# Patient Record
Sex: FEMALE | Race: WHITE | NOT HISPANIC OR LATINO | Employment: OTHER | URBAN - NONMETROPOLITAN AREA
[De-identification: names, ages, dates, MRNs, and addresses within clinical notes are randomized per-mention and may not be internally consistent; named-entity substitution may affect disease eponyms.]

---

## 2017-01-04 ENCOUNTER — HOSPITAL ENCOUNTER (OUTPATIENT)
Dept: MAMMOGRAPHY | Facility: HOSPITAL | Age: 62
End: 2017-01-04
Attending: FAMILY MEDICINE

## 2017-01-05 ENCOUNTER — HOSPITAL ENCOUNTER (OUTPATIENT)
Dept: MAMMOGRAPHY | Facility: HOSPITAL | Age: 62
Discharge: HOME | End: 2017-01-05
Attending: FAMILY MEDICINE

## 2018-06-10 ENCOUNTER — APPOINTMENT (OUTPATIENT)
Dept: CT IMAGING | Facility: HOSPITAL | Age: 63
End: 2018-06-10

## 2018-06-10 ENCOUNTER — APPOINTMENT (OUTPATIENT)
Dept: GENERAL RADIOLOGY | Facility: HOSPITAL | Age: 63
End: 2018-06-10

## 2018-06-10 ENCOUNTER — HOSPITAL ENCOUNTER (INPATIENT)
Facility: HOSPITAL | Age: 63
LOS: 1 days | Discharge: HOME | End: 2018-06-12
Attending: FAMILY MEDICINE | Admitting: FAMILY MEDICINE

## 2018-06-10 DIAGNOSIS — R16.2 HEPATOSPLENOMEGALY: ICD-10-CM

## 2018-06-10 DIAGNOSIS — N30.90 CYSTITIS: ICD-10-CM

## 2018-06-10 DIAGNOSIS — D61.818 PANCYTOPENIA (HCC): Primary | ICD-10-CM

## 2018-06-10 DIAGNOSIS — E86.0 DEHYDRATION: ICD-10-CM

## 2018-06-10 LAB
027 TOXIN: NORMAL
A-A DO2: 37.4 MMHG (ref 0–300)
ALBUMIN SERPL-MCNC: 4.6 G/DL (ref 3.4–4.8)
ALBUMIN/GLOB SERPL: 1.4 G/DL (ref 1.5–2.5)
ALP SERPL-CCNC: 80 U/L (ref 35–104)
ALT SERPL W P-5'-P-CCNC: 55 U/L (ref 10–36)
ANION GAP SERPL CALCULATED.3IONS-SCNC: 9.5 MMOL/L (ref 3.6–11.2)
APTT PPP: 28.3 SECONDS (ref 23.8–36.1)
ARTERIAL PATENCY WRIST A: ABNORMAL
AST SERPL-CCNC: 90 U/L (ref 10–30)
ATMOSPHERIC PRESS: 726 MMHG
BACTERIA UR QL AUTO: ABNORMAL /HPF
BASE EXCESS BLDA CALC-SCNC: -4.3 MMOL/L
BASOPHILS # BLD AUTO: 0.03 10*3/MM3 (ref 0–0.3)
BASOPHILS NFR BLD AUTO: 1.8 % (ref 0–2)
BDY SITE: ABNORMAL
BILIRUB SERPL-MCNC: 0.5 MG/DL (ref 0.2–1.8)
BILIRUB UR QL STRIP: NEGATIVE
BNP SERPL-MCNC: 28 PG/ML (ref 0–100)
BODY TEMPERATURE: 98.6 C
BUN BLD-MCNC: 25 MG/DL (ref 7–21)
BUN/CREAT SERPL: 21 (ref 7–25)
C DIFF TOX GENS STL QL NAA+PROBE: NEGATIVE
CALCIUM SPEC-SCNC: 8.9 MG/DL (ref 7.7–10)
CHLORIDE SERPL-SCNC: 96 MMOL/L (ref 99–112)
CLARITY UR: ABNORMAL
CO2 SERPL-SCNC: 21.5 MMOL/L (ref 24.3–31.9)
COHGB MFR BLD: 1.7 % (ref 0–5)
COLOR UR: YELLOW
CREAT BLD-MCNC: 1.19 MG/DL (ref 0.43–1.29)
CRP SERPL-MCNC: 9.19 MG/DL (ref 0–0.99)
D-LACTATE SERPL-SCNC: 1 MMOL/L (ref 0.5–2)
DEPRECATED RDW RBC AUTO: 46.8 FL (ref 37–54)
EOSINOPHIL # BLD AUTO: 0 10*3/MM3 (ref 0–0.7)
EOSINOPHIL NFR BLD AUTO: 0 % (ref 0–5)
ERYTHROCYTE [DISTWIDTH] IN BLOOD BY AUTOMATED COUNT: 16.5 % (ref 11.5–14.5)
GFR SERPL CREATININE-BSD FRML MDRD: 46 ML/MIN/1.73
GLOBULIN UR ELPH-MCNC: 3.2 GM/DL
GLUCOSE BLD-MCNC: 112 MG/DL (ref 70–110)
GLUCOSE BLDC GLUCOMTR-MCNC: 80 MG/DL (ref 70–130)
GLUCOSE UR STRIP-MCNC: ABNORMAL MG/DL
HCO3 BLDA-SCNC: 18.5 MMOL/L (ref 22–26)
HCT VFR BLD AUTO: 35 % (ref 37–47)
HCT VFR BLD CALC: 33 % (ref 37–47)
HGB BLD-MCNC: 11.1 G/DL (ref 12–16)
HGB BLDA-MCNC: 11.3 G/DL (ref 12–16)
HGB UR QL STRIP.AUTO: ABNORMAL
HOROWITZ INDEX BLD+IHG-RTO: 21 %
HYALINE CASTS UR QL AUTO: ABNORMAL /LPF
IMM GRANULOCYTES # BLD: 0 10*3/MM3 (ref 0–0.03)
IMM GRANULOCYTES NFR BLD: 0 % (ref 0–0.5)
INR PPP: 1.1 (ref 0.9–1.1)
KETONES UR QL STRIP: ABNORMAL
LACTOFERRIN STL QL LA: NEGATIVE
LEUKOCYTE ESTERASE UR QL STRIP.AUTO: ABNORMAL
LIPASE SERPL-CCNC: 49 U/L (ref 13–60)
LYMPHOCYTES # BLD AUTO: 0.26 10*3/MM3 (ref 1–3)
LYMPHOCYTES NFR BLD AUTO: 15.4 % (ref 21–51)
MCH RBC QN AUTO: 25.4 PG (ref 27–33)
MCHC RBC AUTO-ENTMCNC: 31.7 G/DL (ref 33–37)
MCV RBC AUTO: 80.1 FL (ref 80–94)
METHGB BLD QL: 0.2 % (ref 0–3)
MODALITY: ABNORMAL
MONOCYTES # BLD AUTO: 0.16 10*3/MM3 (ref 0.1–0.9)
MONOCYTES NFR BLD AUTO: 9.5 % (ref 0–10)
NEUTROPHILS # BLD AUTO: 1.24 10*3/MM3 (ref 1.4–6.5)
NEUTROPHILS NFR BLD AUTO: 73.3 % (ref 30–70)
NITRITE UR QL STRIP: NEGATIVE
OSMOLALITY SERPL CALC.SUM OF ELEC: 260.4 MOSM/KG (ref 273–305)
OXYHGB MFR BLDV: 92.4 % (ref 85–100)
PCO2 BLDA: 27.3 MM HG (ref 35–45)
PH BLDA: 7.45 PH UNITS (ref 7.35–7.45)
PH UR STRIP.AUTO: <=5 [PH] (ref 5–8)
PLATELET # BLD AUTO: 75 10*3/MM3 (ref 130–400)
PMV BLD AUTO: 10.4 FL (ref 6–10)
PO2 BLDA: 72.5 MM HG (ref 80–100)
POTASSIUM BLD-SCNC: 3.9 MMOL/L (ref 3.5–5.3)
PROT SERPL-MCNC: 7.8 G/DL (ref 6–8)
PROT UR QL STRIP: ABNORMAL
PROTHROMBIN TIME: 14.4 SECONDS (ref 11–15.4)
RBC # BLD AUTO: 4.37 10*6/MM3 (ref 4.2–5.4)
RBC # UR: ABNORMAL /HPF
REF LAB TEST METHOD: ABNORMAL
SAO2 % BLDCOA: 94.2 % (ref 90–100)
SODIUM BLD-SCNC: 127 MMOL/L (ref 135–153)
SP GR UR STRIP: 1.03 (ref 1–1.03)
SQUAMOUS #/AREA URNS HPF: ABNORMAL /HPF
TROPONIN I SERPL-MCNC: 0.02 NG/ML
UROBILINOGEN UR QL STRIP: ABNORMAL
WBC NRBC COR # BLD: 1.69 10*3/MM3 (ref 4.5–12.5)
WBC UR QL AUTO: ABNORMAL /HPF
YEAST URNS QL MICRO: ABNORMAL /HPF

## 2018-06-10 PROCEDURE — 74177 CT ABD & PELVIS W/CONTRAST: CPT

## 2018-06-10 PROCEDURE — 81001 URINALYSIS AUTO W/SCOPE: CPT | Performed by: FAMILY MEDICINE

## 2018-06-10 PROCEDURE — 80053 COMPREHEN METABOLIC PANEL: CPT | Performed by: FAMILY MEDICINE

## 2018-06-10 PROCEDURE — 25010000002 ONDANSETRON PER 1 MG: Performed by: FAMILY MEDICINE

## 2018-06-10 PROCEDURE — 86668 FRANCISELLA TULARENSIS: CPT | Performed by: FAMILY MEDICINE

## 2018-06-10 PROCEDURE — 87493 C DIFF AMPLIFIED PROBE: CPT | Performed by: FAMILY MEDICINE

## 2018-06-10 PROCEDURE — G0378 HOSPITAL OBSERVATION PER HR: HCPCS

## 2018-06-10 PROCEDURE — 86618 LYME DISEASE ANTIBODY: CPT | Performed by: FAMILY MEDICINE

## 2018-06-10 PROCEDURE — 84484 ASSAY OF TROPONIN QUANT: CPT | Performed by: FAMILY MEDICINE

## 2018-06-10 PROCEDURE — 83690 ASSAY OF LIPASE: CPT | Performed by: FAMILY MEDICINE

## 2018-06-10 PROCEDURE — 25010000002 IOPAMIDOL 61 % SOLUTION: Performed by: FAMILY MEDICINE

## 2018-06-10 PROCEDURE — 86140 C-REACTIVE PROTEIN: CPT | Performed by: FAMILY MEDICINE

## 2018-06-10 PROCEDURE — 85060 BLOOD SMEAR INTERPRETATION: CPT | Performed by: FAMILY MEDICINE

## 2018-06-10 PROCEDURE — 87045 FECES CULTURE AEROBIC BACT: CPT | Performed by: FAMILY MEDICINE

## 2018-06-10 PROCEDURE — 74177 CT ABD & PELVIS W/CONTRAST: CPT | Performed by: RADIOLOGY

## 2018-06-10 PROCEDURE — 99283 EMERGENCY DEPT VISIT LOW MDM: CPT

## 2018-06-10 PROCEDURE — 86666 EHRLICHIA ANTIBODY: CPT | Performed by: FAMILY MEDICINE

## 2018-06-10 PROCEDURE — 93005 ELECTROCARDIOGRAM TRACING: CPT | Performed by: FAMILY MEDICINE

## 2018-06-10 PROCEDURE — 87899 AGENT NOS ASSAY W/OPTIC: CPT | Performed by: FAMILY MEDICINE

## 2018-06-10 PROCEDURE — 82805 BLOOD GASES W/O2 SATURATION: CPT | Performed by: FAMILY MEDICINE

## 2018-06-10 PROCEDURE — 87040 BLOOD CULTURE FOR BACTERIA: CPT | Performed by: FAMILY MEDICINE

## 2018-06-10 PROCEDURE — 86757 RICKETTSIA ANTIBODY: CPT | Performed by: FAMILY MEDICINE

## 2018-06-10 PROCEDURE — 36415 COLL VENOUS BLD VENIPUNCTURE: CPT

## 2018-06-10 PROCEDURE — 93010 ELECTROCARDIOGRAM REPORT: CPT | Performed by: INTERNAL MEDICINE

## 2018-06-10 PROCEDURE — 82962 GLUCOSE BLOOD TEST: CPT

## 2018-06-10 PROCEDURE — 71045 X-RAY EXAM CHEST 1 VIEW: CPT | Performed by: RADIOLOGY

## 2018-06-10 PROCEDURE — 83605 ASSAY OF LACTIC ACID: CPT | Performed by: FAMILY MEDICINE

## 2018-06-10 PROCEDURE — 82375 ASSAY CARBOXYHB QUANT: CPT | Performed by: FAMILY MEDICINE

## 2018-06-10 PROCEDURE — 25010000002 ENOXAPARIN PER 10 MG: Performed by: FAMILY MEDICINE

## 2018-06-10 PROCEDURE — 36600 WITHDRAWAL OF ARTERIAL BLOOD: CPT | Performed by: FAMILY MEDICINE

## 2018-06-10 PROCEDURE — 86753 PROTOZOA ANTIBODY NOS: CPT | Performed by: FAMILY MEDICINE

## 2018-06-10 PROCEDURE — 99284 EMERGENCY DEPT VISIT MOD MDM: CPT

## 2018-06-10 PROCEDURE — 85610 PROTHROMBIN TIME: CPT | Performed by: FAMILY MEDICINE

## 2018-06-10 PROCEDURE — 83050 HGB METHEMOGLOBIN QUAN: CPT | Performed by: FAMILY MEDICINE

## 2018-06-10 PROCEDURE — 87046 STOOL CULTR AEROBIC BACT EA: CPT | Performed by: FAMILY MEDICINE

## 2018-06-10 PROCEDURE — 85025 COMPLETE CBC W/AUTO DIFF WBC: CPT | Performed by: FAMILY MEDICINE

## 2018-06-10 PROCEDURE — 94799 UNLISTED PULMONARY SVC/PX: CPT

## 2018-06-10 PROCEDURE — 83880 ASSAY OF NATRIURETIC PEPTIDE: CPT | Performed by: FAMILY MEDICINE

## 2018-06-10 PROCEDURE — 85730 THROMBOPLASTIN TIME PARTIAL: CPT | Performed by: FAMILY MEDICINE

## 2018-06-10 PROCEDURE — 71045 X-RAY EXAM CHEST 1 VIEW: CPT

## 2018-06-10 PROCEDURE — 83631 LACTOFERRIN FECAL (QUANT): CPT | Performed by: FAMILY MEDICINE

## 2018-06-10 PROCEDURE — 25010000002 CEFTRIAXONE: Performed by: FAMILY MEDICINE

## 2018-06-10 RX ORDER — ROSUVASTATIN CALCIUM 20 MG/1
20 TABLET, COATED ORAL NIGHTLY
COMMUNITY

## 2018-06-10 RX ORDER — DEXTROSE MONOHYDRATE 25 G/50ML
25 INJECTION, SOLUTION INTRAVENOUS
Status: DISCONTINUED | OUTPATIENT
Start: 2018-06-10 | End: 2018-06-12 | Stop reason: HOSPADM

## 2018-06-10 RX ORDER — GABAPENTIN 400 MG/1
800 CAPSULE ORAL EVERY 8 HOURS SCHEDULED
Status: DISCONTINUED | OUTPATIENT
Start: 2018-06-10 | End: 2018-06-12 | Stop reason: HOSPADM

## 2018-06-10 RX ORDER — SODIUM CHLORIDE 9 MG/ML
125 INJECTION, SOLUTION INTRAVENOUS CONTINUOUS
Status: DISCONTINUED | OUTPATIENT
Start: 2018-06-10 | End: 2018-06-10

## 2018-06-10 RX ORDER — ACETAMINOPHEN 325 MG/1
650 TABLET ORAL EVERY 6 HOURS PRN
Status: DISCONTINUED | OUTPATIENT
Start: 2018-06-10 | End: 2018-06-12 | Stop reason: HOSPADM

## 2018-06-10 RX ORDER — NICOTINE POLACRILEX 4 MG
15 LOZENGE BUCCAL
Status: DISCONTINUED | OUTPATIENT
Start: 2018-06-10 | End: 2018-06-12 | Stop reason: HOSPADM

## 2018-06-10 RX ORDER — ONDANSETRON 2 MG/ML
4 INJECTION INTRAMUSCULAR; INTRAVENOUS EVERY 6 HOURS PRN
Status: DISCONTINUED | OUTPATIENT
Start: 2018-06-10 | End: 2018-06-12 | Stop reason: HOSPADM

## 2018-06-10 RX ORDER — ROSUVASTATIN CALCIUM 20 MG/1
20 TABLET, COATED ORAL NIGHTLY
Status: DISCONTINUED | OUTPATIENT
Start: 2018-06-10 | End: 2018-06-12 | Stop reason: HOSPADM

## 2018-06-10 RX ORDER — PANTOPRAZOLE SODIUM 40 MG/1
40 TABLET, DELAYED RELEASE ORAL DAILY
Status: DISCONTINUED | OUTPATIENT
Start: 2018-06-11 | End: 2018-06-12 | Stop reason: HOSPADM

## 2018-06-10 RX ORDER — DEXTROSE AND SODIUM CHLORIDE 5; .45 G/100ML; G/100ML
100 INJECTION, SOLUTION INTRAVENOUS CONTINUOUS
Status: DISCONTINUED | OUTPATIENT
Start: 2018-06-10 | End: 2018-06-12 | Stop reason: HOSPADM

## 2018-06-10 RX ORDER — SODIUM CHLORIDE 0.9 % (FLUSH) 0.9 %
10 SYRINGE (ML) INJECTION AS NEEDED
Status: DISCONTINUED | OUTPATIENT
Start: 2018-06-10 | End: 2018-06-12 | Stop reason: HOSPADM

## 2018-06-10 RX ORDER — NITROFURANTOIN MACROCRYSTALS 100 MG/1
100 CAPSULE ORAL 4 TIMES DAILY
Status: ON HOLD | COMMUNITY
End: 2018-06-10

## 2018-06-10 RX ORDER — NITROGLYCERIN 0.4 MG/1
0.4 TABLET SUBLINGUAL
Status: DISCONTINUED | OUTPATIENT
Start: 2018-06-10 | End: 2018-06-12 | Stop reason: HOSPADM

## 2018-06-10 RX ORDER — ONDANSETRON 4 MG/1
4 TABLET, FILM COATED ORAL EVERY 8 HOURS PRN
Status: CANCELLED | OUTPATIENT
Start: 2018-06-10

## 2018-06-10 RX ORDER — BACLOFEN 10 MG/1
10 TABLET ORAL 3 TIMES DAILY
Status: DISCONTINUED | OUTPATIENT
Start: 2018-06-10 | End: 2018-06-12 | Stop reason: HOSPADM

## 2018-06-10 RX ORDER — METOPROLOL SUCCINATE 25 MG/1
25 TABLET, EXTENDED RELEASE ORAL DAILY
Status: DISCONTINUED | OUTPATIENT
Start: 2018-06-11 | End: 2018-06-12 | Stop reason: HOSPADM

## 2018-06-10 RX ORDER — ONDANSETRON 4 MG/1
4 TABLET, FILM COATED ORAL EVERY 8 HOURS PRN
COMMUNITY

## 2018-06-10 RX ORDER — OXYCODONE AND ACETAMINOPHEN 7.5; 325 MG/1; MG/1
1 TABLET ORAL EVERY 6 HOURS PRN
Status: CANCELLED | OUTPATIENT
Start: 2018-06-10

## 2018-06-10 RX ORDER — GLIPIZIDE 5 MG/1
10 TABLET ORAL
Status: DISCONTINUED | OUTPATIENT
Start: 2018-06-11 | End: 2018-06-12 | Stop reason: HOSPADM

## 2018-06-10 RX ORDER — AMITRIPTYLINE HYDROCHLORIDE 25 MG/1
25 TABLET, FILM COATED ORAL NIGHTLY
Status: DISCONTINUED | OUTPATIENT
Start: 2018-06-10 | End: 2018-06-12 | Stop reason: HOSPADM

## 2018-06-10 RX ORDER — VENLAFAXINE 37.5 MG/1
75 TABLET ORAL 2 TIMES DAILY
Status: DISCONTINUED | OUTPATIENT
Start: 2018-06-10 | End: 2018-06-12 | Stop reason: HOSPADM

## 2018-06-10 RX ORDER — OXYCODONE AND ACETAMINOPHEN 7.5; 325 MG/1; MG/1
1 TABLET ORAL EVERY 6 HOURS PRN
Status: DISCONTINUED | OUTPATIENT
Start: 2018-06-10 | End: 2018-06-12 | Stop reason: HOSPADM

## 2018-06-10 RX ADMIN — AMITRIPTYLINE HYDROCHLORIDE 25 MG: 25 TABLET, FILM COATED ORAL at 22:17

## 2018-06-10 RX ADMIN — GABAPENTIN 800 MG: 400 CAPSULE ORAL at 22:16

## 2018-06-10 RX ADMIN — ENOXAPARIN SODIUM 40 MG: 100 INJECTION SUBCUTANEOUS at 22:21

## 2018-06-10 RX ADMIN — SODIUM CHLORIDE 125 ML/HR: 9 INJECTION, SOLUTION INTRAVENOUS at 14:24

## 2018-06-10 RX ADMIN — ROSUVASTATIN CALCIUM 20 MG: 20 TABLET, FILM COATED ORAL at 22:17

## 2018-06-10 RX ADMIN — METFORMIN HYDROCHLORIDE 1000 MG: 500 TABLET ORAL at 22:19

## 2018-06-10 RX ADMIN — VENLAFAXINE HYDROCHLORIDE 75 MG: 37.5 TABLET ORAL at 22:16

## 2018-06-10 RX ADMIN — ONDANSETRON 4 MG: 2 INJECTION INTRAMUSCULAR; INTRAVENOUS at 20:33

## 2018-06-10 RX ADMIN — OXYCODONE HYDROCHLORIDE AND ACETAMINOPHEN 1 TABLET: 7.5; 325 TABLET ORAL at 20:33

## 2018-06-10 RX ADMIN — CEFTRIAXONE 1 G: 1 INJECTION, POWDER, FOR SOLUTION INTRAMUSCULAR; INTRAVENOUS at 18:23

## 2018-06-10 RX ADMIN — BACLOFEN 10 MG: 10 TABLET ORAL at 22:17

## 2018-06-10 RX ADMIN — DEXTROSE AND SODIUM CHLORIDE 100 ML/HR: 5; 450 INJECTION, SOLUTION INTRAVENOUS at 20:33

## 2018-06-10 RX ADMIN — DOXYCYCLINE 100 MG: 100 INJECTION, POWDER, LYOPHILIZED, FOR SOLUTION INTRAVENOUS at 19:20

## 2018-06-10 RX ADMIN — IOPAMIDOL 100 ML: 612 INJECTION, SOLUTION INTRAVENOUS at 16:35

## 2018-06-10 NOTE — ED NOTES
Pt being transported to room 316 at this via wheelchair per ED tech. Pt has patent IV to left AC with IV fluids and abx infusing as ordered. NADN. Report called to Jeny Alcantara RN     Marianna Ivy RN  06/10/18 1925

## 2018-06-10 NOTE — ED PROVIDER NOTES
Subjective   62-year-old white female with past history of hypertension hyperlipidemia diabetes presents emergency department complaining of weakness diarrhea and not feeling well patient says she went to the emergency department at [place] on 6/8/18 I was diagnosed with a urinary tract infection and was put on Macrobid home said developed diarrhea and just is not feeling any better or said she went home, and the reason they did originally went to the emergency department because she's been having muscle cramps and just wasn't feeling well and she still not feeling any better and came in to be evaluated        History provided by:  Patient  Illness   Location:  Diarrhea, fatigue, recent diagnosis of UTI  Severity:  Moderate  Onset quality:  Gradual  Timing:  Constant  Progression:  Worsening  Chronicity:  New  Context:  Diagnoses UTI on 68 and is now developed fatigue diarrhea and just not feeling any better  Associated symptoms: fatigue and myalgias        Review of Systems   Constitutional: Positive for fatigue.   Musculoskeletal: Positive for myalgias.       Past Medical History:   Diagnosis Date   • Arthritis    • Depression    • Diabetes mellitus    • Hyperlipidemia    • Hypertension    • Incontinence     B&B   • Infectious viral hepatitis    • Kidney stone        No Known Allergies    Past Surgical History:   Procedure Laterality Date   • APPENDECTOMY     • CHOLECYSTECTOMY     • EYE SURGERY     • HEMORRHOIDECTOMY     • SPINE SURGERY      lumbar   • TEMPOROMANDIBULAR JOINT SURGERY     • TUBAL ABDOMINAL LIGATION         Family History   Problem Relation Age of Onset   • Diabetes Mother    • Heart disease Father        Social History     Social History   • Marital status:      Social History Main Topics   • Smoking status: Former Smoker   • Alcohol use No   • Drug use: No     Other Topics Concern   • Not on file           Objective   Physical Exam   Constitutional: She is oriented to person, place, and  time. She appears well-developed and well-nourished.   HENT:   Head: Normocephalic and atraumatic.   Right Ear: External ear normal.   Left Ear: External ear normal.   Nose: Nose normal.   Mouth/Throat: Oropharynx is clear and moist.   Eyes: EOM are normal. Pupils are equal, round, and reactive to light.   Neck: Neck supple.   Cardiovascular: Normal rate and regular rhythm.    Pulmonary/Chest: Effort normal and breath sounds normal.   Abdominal: Soft. Bowel sounds are normal.   Musculoskeletal: Normal range of motion.   Neurological: She is alert and oriented to person, place, and time.   Skin: Skin is warm. Capillary refill takes less than 2 seconds.   Psychiatric: She has a normal mood and affect. Her behavior is normal. Judgment and thought content normal.   Nursing note and vitals reviewed.      Procedures           ED Course  ED Course as of Kush 10 1747   Sun Kush 10, 2018   1728 Patient seen at [facility] on 6/8/18 and diagnosed with a urinary tract infection  [MH]      ED Course User Index  [MH] Debbie Gilliam, DO                  MDM  Number of Diagnoses or Management Options  Cystitis: new and requires workup  Dehydration: new and requires workup  Hepatosplenomegaly: new and requires workup  Pancytopenia: new and requires workup     Amount and/or Complexity of Data Reviewed  Clinical lab tests: ordered and reviewed  Tests in the radiology section of CPT®: reviewed and ordered  Tests in the medicine section of CPT®: reviewed and ordered  Discuss the patient with other providers: yes  Independent visualization of images, tracings, or specimens: yes          Final diagnoses:   Pancytopenia   Cystitis   Dehydration            Debbie Gilliam DO  06/11/18 0213

## 2018-06-10 NOTE — ED NOTES
Family and pt reports pt has been having some spasms that started on Monday, seen at [facility] on Friday, x3 days with vomiting, diarrhea, not eating     Luanne Kirkland RN  06/10/18 0270

## 2018-06-10 NOTE — ED NOTES
Pt assisted by er tech to bathroom and back to room, tolerated well     Luanne Kirkland RN  06/10/18 [id number]

## 2018-06-10 NOTE — ED NOTES
Assisted pt restroom via wheelchair to obtain urine and stool samples. Urine specimen obtained. Pt was unable to provide stool sample at this time. Nurse aware.     Katerina Robledo  06/10/18 3332

## 2018-06-11 LAB
ALBUMIN SERPL-MCNC: 3.9 G/DL (ref 3.4–4.8)
ALBUMIN/GLOB SERPL: 1.5 G/DL (ref 1.5–2.5)
ALP SERPL-CCNC: 70 U/L (ref 35–104)
ALT SERPL W P-5'-P-CCNC: 47 U/L (ref 10–36)
ANION GAP SERPL CALCULATED.3IONS-SCNC: 8.2 MMOL/L (ref 3.6–11.2)
ANISOCYTOSIS BLD QL: ABNORMAL
AST SERPL-CCNC: 83 U/L (ref 10–30)
BASOPHILS # BLD MANUAL: 0.03 10*3/MM3 (ref 0–0.3)
BASOPHILS NFR BLD AUTO: 2 % (ref 0–2)
BILIRUB SERPL-MCNC: 0.3 MG/DL (ref 0.2–1.8)
BUN BLD-MCNC: 23 MG/DL (ref 7–21)
BUN/CREAT SERPL: 21.5 (ref 7–25)
CALCIUM SPEC-SCNC: 8 MG/DL (ref 7.7–10)
CHLORIDE SERPL-SCNC: 102 MMOL/L (ref 99–112)
CO2 SERPL-SCNC: 19.8 MMOL/L (ref 24.3–31.9)
CREAT BLD-MCNC: 1.07 MG/DL (ref 0.43–1.29)
DEPRECATED RDW RBC AUTO: 46.6 FL (ref 37–54)
ERYTHROCYTE [DISTWIDTH] IN BLOOD BY AUTOMATED COUNT: 16.4 % (ref 11.5–14.5)
GFR SERPL CREATININE-BSD FRML MDRD: 52 ML/MIN/1.73
GLOBULIN UR ELPH-MCNC: 2.6 GM/DL
GLUCOSE BLD-MCNC: 98 MG/DL (ref 70–110)
GLUCOSE BLDC GLUCOMTR-MCNC: 123 MG/DL (ref 70–130)
GLUCOSE BLDC GLUCOMTR-MCNC: 169 MG/DL (ref 70–130)
GLUCOSE BLDC GLUCOMTR-MCNC: 48 MG/DL (ref 70–130)
GLUCOSE BLDC GLUCOMTR-MCNC: 55 MG/DL (ref 70–130)
GLUCOSE BLDC GLUCOMTR-MCNC: 60 MG/DL (ref 70–130)
GLUCOSE BLDC GLUCOMTR-MCNC: 86 MG/DL (ref 70–130)
GLUCOSE BLDC GLUCOMTR-MCNC: 89 MG/DL (ref 70–130)
HCT VFR BLD AUTO: 32.8 % (ref 37–47)
HGB BLD-MCNC: 10.5 G/DL (ref 12–16)
HYPOCHROMIA BLD QL: ABNORMAL
LYMPHOCYTES # BLD MANUAL: 0.48 10*3/MM3 (ref 1–3)
LYMPHOCYTES NFR BLD MANUAL: 24 % (ref 0–10)
LYMPHOCYTES NFR BLD MANUAL: 28 % (ref 21–51)
MCH RBC QN AUTO: 25.7 PG (ref 27–33)
MCHC RBC AUTO-ENTMCNC: 32 G/DL (ref 33–37)
MCV RBC AUTO: 80.2 FL (ref 80–94)
MONOCYTES # BLD AUTO: 0.41 10*3/MM3 (ref 0.1–0.9)
NEUTROPHILS # BLD AUTO: 0.79 10*3/MM3 (ref 1.4–6.5)
NEUTROPHILS NFR BLD MANUAL: 42 % (ref 30–70)
NEUTS BAND NFR BLD MANUAL: 4 % (ref 4–12)
NRBC SPEC MANUAL: 0 /100 WBC (ref 0–0)
OSMOLALITY SERPL CALC.SUM OF ELEC: 264.5 MOSM/KG (ref 273–305)
PLATELET # BLD AUTO: 55 10*3/MM3 (ref 130–400)
PMV BLD AUTO: 11 FL (ref 6–10)
POTASSIUM BLD-SCNC: 3.5 MMOL/L (ref 3.5–5.3)
PROT SERPL-MCNC: 6.5 G/DL (ref 6–8)
RBC # BLD AUTO: 4.09 10*6/MM3 (ref 4.2–5.4)
SMALL PLATELETS BLD QL SMEAR: ABNORMAL
SODIUM BLD-SCNC: 130 MMOL/L (ref 135–153)
WBC NRBC COR # BLD: 1.72 10*3/MM3 (ref 4.5–12.5)

## 2018-06-11 PROCEDURE — 80053 COMPREHEN METABOLIC PANEL: CPT | Performed by: FAMILY MEDICINE

## 2018-06-11 PROCEDURE — 63710000001 INSULIN DETEMIR PER 5 UNITS: Performed by: FAMILY MEDICINE

## 2018-06-11 PROCEDURE — 94799 UNLISTED PULMONARY SVC/PX: CPT

## 2018-06-11 PROCEDURE — 85025 COMPLETE CBC W/AUTO DIFF WBC: CPT | Performed by: FAMILY MEDICINE

## 2018-06-11 PROCEDURE — 82962 GLUCOSE BLOOD TEST: CPT

## 2018-06-11 PROCEDURE — 85007 BL SMEAR W/DIFF WBC COUNT: CPT | Performed by: FAMILY MEDICINE

## 2018-06-11 PROCEDURE — 25010000002 CEFTRIAXONE: Performed by: FAMILY MEDICINE

## 2018-06-11 RX ADMIN — VENLAFAXINE HYDROCHLORIDE 75 MG: 37.5 TABLET ORAL at 09:27

## 2018-06-11 RX ADMIN — BACLOFEN 10 MG: 10 TABLET ORAL at 20:17

## 2018-06-11 RX ADMIN — GABAPENTIN 800 MG: 400 CAPSULE ORAL at 05:12

## 2018-06-11 RX ADMIN — INSULIN DETEMIR 58 UNITS: 100 INJECTION, SOLUTION SUBCUTANEOUS at 09:00

## 2018-06-11 RX ADMIN — PANTOPRAZOLE SODIUM 40 MG: 40 TABLET, DELAYED RELEASE ORAL at 09:27

## 2018-06-11 RX ADMIN — METFORMIN HYDROCHLORIDE 1000 MG: 500 TABLET ORAL at 17:25

## 2018-06-11 RX ADMIN — GABAPENTIN 800 MG: 400 CAPSULE ORAL at 20:17

## 2018-06-11 RX ADMIN — CEFTRIAXONE 1 G: 1 INJECTION, POWDER, FOR SOLUTION INTRAMUSCULAR; INTRAVENOUS at 12:31

## 2018-06-11 RX ADMIN — METOPROLOL SUCCINATE 25 MG: 25 TABLET, FILM COATED, EXTENDED RELEASE ORAL at 09:27

## 2018-06-11 RX ADMIN — GABAPENTIN 800 MG: 400 CAPSULE ORAL at 13:35

## 2018-06-11 RX ADMIN — ACETAMINOPHEN 650 MG: 325 TABLET, FILM COATED ORAL at 03:37

## 2018-06-11 RX ADMIN — GLIPIZIDE 10 MG: 5 TABLET ORAL at 09:27

## 2018-06-11 RX ADMIN — METFORMIN HYDROCHLORIDE 1000 MG: 500 TABLET ORAL at 09:27

## 2018-06-11 RX ADMIN — DOXYCYCLINE 100 MG: 100 INJECTION, POWDER, LYOPHILIZED, FOR SOLUTION INTRAVENOUS at 05:13

## 2018-06-11 RX ADMIN — OXYCODONE HYDROCHLORIDE AND ACETAMINOPHEN 1 TABLET: 7.5; 325 TABLET ORAL at 20:30

## 2018-06-11 RX ADMIN — VENLAFAXINE HYDROCHLORIDE 75 MG: 37.5 TABLET ORAL at 20:16

## 2018-06-11 RX ADMIN — DEXTROSE AND SODIUM CHLORIDE 100 ML/HR: 5; 450 INJECTION, SOLUTION INTRAVENOUS at 05:16

## 2018-06-11 RX ADMIN — ROSUVASTATIN CALCIUM 20 MG: 20 TABLET, FILM COATED ORAL at 20:16

## 2018-06-11 RX ADMIN — DOXYCYCLINE 100 MG: 100 INJECTION, POWDER, LYOPHILIZED, FOR SOLUTION INTRAVENOUS at 17:26

## 2018-06-11 RX ADMIN — GLIPIZIDE 10 MG: 5 TABLET ORAL at 17:25

## 2018-06-11 RX ADMIN — AMITRIPTYLINE HYDROCHLORIDE 25 MG: 25 TABLET, FILM COATED ORAL at 20:17

## 2018-06-11 RX ADMIN — OXYCODONE HYDROCHLORIDE AND ACETAMINOPHEN 1 TABLET: 7.5; 325 TABLET ORAL at 05:12

## 2018-06-11 RX ADMIN — BACLOFEN 10 MG: 10 TABLET ORAL at 09:27

## 2018-06-11 RX ADMIN — DEXTROSE MONOHYDRATE 25 G: 25 INJECTION, SOLUTION INTRAVENOUS at 22:27

## 2018-06-11 RX ADMIN — BACLOFEN 10 MG: 10 TABLET ORAL at 17:25

## 2018-06-11 NOTE — PLAN OF CARE
Problem: Fall Risk (Adult)  Goal: Identify Related Risk Factors and Signs and Symptoms  Outcome: Ongoing (interventions implemented as appropriate)    Goal: Absence of Fall  Outcome: Ongoing (interventions implemented as appropriate)      Problem: Pain, Acute (Adult)  Goal: Identify Related Risk Factors and Signs and Symptoms  Outcome: Ongoing (interventions implemented as appropriate)    Goal: Acceptable Pain Control/Comfort Level  Outcome: Ongoing (interventions implemented as appropriate)      Problem: Fluid Volume Deficit (Adult)  Goal: Identify Related Risk Factors and Signs and Symptoms  Outcome: Ongoing (interventions implemented as appropriate)    Goal: Optimal Fluid Balance  Outcome: Ongoing (interventions implemented as appropriate)

## 2018-06-11 NOTE — PROGRESS NOTES
"     LOS: 0 days     Chief Complaint:  Pancytopenia/UTI/Possible tick borne illness    Subjective     Interval History:   She reports that she is feeling some better this morning after having splet well overnight. She was noted febrile overnight with temp of 101.1. She remains on Rocephin + doxycycline. No further diarrhea or muscle spasms. WBC's 1.72, Hgb 10.5, platelets have dropped to 55,000. No further sweats or chills. She denies any acute complaints this AM. She is a poor historian and somewhat somnolent this AM.       Objective     Vital Signs  /64 (BP Location: Right arm, Patient Position: Lying)   Pulse 77   Temp 97.7 °F (36.5 °C) (Oral)   Resp 18   Ht 162.6 cm (64\")   Wt 81.6 kg (180 lb)   SpO2 95%   BMI 30.90 kg/m²   Intake & Output (last day)       06/10 0701 - 06/11 0700 06/11 0701 - 06/12 0700    I.V. (mL/kg) 1000 (12.3)     IV Piggyback 300     Total Intake(mL/kg) 1300 (15.9)     Urine (mL/kg/hr) 600     Stool 0     Total Output 600      Net +700            Unmeasured Stool Occurrence 2 x             Physical Exam:     General Appearance:    Solmnolent yet cooperative, in no acute distress   Head:    Normocephalic, without obvious abnormality, atraumatic   Eyes:            Lids and lashes normal, conjunctivae and sclerae normal, no   icterus, no pallor, corneas clear, PERRLA   Ears:    Ears appear intact with no abnormalities noted   Throat:   No oral lesions, no thrush, oral mucosa moist   Neck:   No adenopathy, supple, trachea midline, no thyromegaly, no   carotid bruit, no JVD   Lungs:     Clear to auscultation,respirations regular, even and                  unlabored    Heart:    Regular rhythm and normal rate, normal S1 and S2, no            murmur, no gallop, no rub, no click   Chest Wall:    No abnormalities observed   Abdomen:     Normal bowel sounds, no masses, no organomegaly, soft        non-tender, non-distended, no guarding, no rebound                tenderness   Extremities: "   Moves all extremities well, no edema, no cyanosis, no             redness   Pulses:   Pulses palpable and equal bilaterally   Skin:   No bleeding, bruising or rash   Lymph nodes:   No palpable adenopathy   Neurologic:   Cranial nerves 2 - 12 grossly intact, sensation intact, DTR       present and equal bilaterally        Results Review:    Lab Results   Component Value Date    WBC 1.72 (C) 06/11/2018    HGB 10.5 (L) 06/11/2018    HCT 32.8 (L) 06/11/2018    MCV 80.2 06/11/2018    PLT 55 (L) 06/11/2018       Lab Results   Component Value Date    GLUCOSE 98 06/11/2018    BUN 23 (H) 06/11/2018    CREATININE 1.07 06/11/2018    EGFRIFNONA 52 (L) 06/11/2018    BCR 21.5 06/11/2018     (L) 06/11/2018    K 3.5 06/11/2018     06/11/2018    CO2 19.8 (L) 06/11/2018    CALCIUM 8.0 06/11/2018    ALBUMIN 3.90 06/11/2018    LABIL2 1.5 06/11/2018    AST 83 (H) 06/11/2018    ALT 47 (H) 06/11/2018     Lab Results   Component Value Date    INR 1.10 06/10/2018       Glucose   Date Value Ref Range Status   06/11/2018 86 70 - 130 mg/dL Final   06/10/2018 80 70 - 130 mg/dL Final          Medication Review:     Current Facility-Administered Medications:   •  acetaminophen (TYLENOL) tablet 650 mg, 650 mg, Oral, Q6H PRN, Samuel Duane Kreis, MD, 650 mg at 06/11/18 0337  •  amitriptyline (ELAVIL) tablet 25 mg, 25 mg, Oral, Nightly, Samuel Duane Kreis, MD, 25 mg at 06/10/18 2217  •  baclofen (LIORESAL) tablet 10 mg, 10 mg, Oral, TID, Samuel Duane Kreis, MD, 10 mg at 06/10/18 2217  •  cefTRIAXone (ROCEPHIN) 1 g/100 mL 0.9% NS (MBP), 1 g, Intravenous, Q24H, Samuel Duane Kreis, MD  •  dextrose (D50W) solution 25 g, 25 g, Intravenous, Q15 Min PRN, Samuel Duane Kreis, MD  •  dextrose (GLUTOSE) oral gel 15 g, 15 g, Oral, Q15 Min PRN, Samuel Duane Kreis, MD  •  dextrose 5 % and sodium chloride 0.45 % infusion, 100 mL/hr, Intravenous, Continuous, Samuel Duane Kreis, MD, Last Rate: 100 mL/hr at 06/11/18 0516, 100 mL/hr at 06/11/18 0516  •   doxycycline (VIBRAMYCIN) 100 mg/100 mL 0.9% NS MBP, 100 mg, Intravenous, Q12H, Samuel Duane Kreis, MD, 100 mg at 06/11/18 0513  •  enoxaparin (LOVENOX) syringe 40 mg, 40 mg, Subcutaneous, Q24H, Samuel Duane Kreis, MD, 40 mg at 06/10/18 2221  •  gabapentin (NEURONTIN) capsule 800 mg, 800 mg, Oral, Q8H, Samuel Duane Kreis, MD, 800 mg at 06/11/18 0512  •  glipiZIDE (GLUCOTROL) tablet 10 mg, 10 mg, Oral, BID AC, Samuel Duane Kreis, MD  •  glucagon (human recombinant) (GLUCAGEN DIAGNOSTIC) injection 1 mg, 1 mg, Subcutaneous, PRN, Samuel Duane Kreis, MD  •  insulin aspart (novoLOG) injection 0-9 Units, 0-9 Units, Subcutaneous, 4x Daily AC & at Bedtime, Samuel Duane Kreis, MD  •  insulin detemir (LEVEMIR) injection 58 Units, 58 Units, Subcutaneous, Daily, Samuel Duane Kreis, MD  •  metFORMIN (GLUCOPHAGE) tablet 1,000 mg, 1,000 mg, Oral, BID With Meals, Samuel Duane Kreis, MD, 1,000 mg at 06/10/18 2219  •  metoprolol succinate XL (TOPROL-XL) 24 hr tablet 25 mg, 25 mg, Oral, Daily, Samuel Duane Kreis, MD  •  nitroglycerin (NITROSTAT) SL tablet 0.4 mg, 0.4 mg, Sublingual, Q5 Min PRN, Samuel Duane Kreis, MD  •  ondansetron (ZOFRAN) injection 4 mg, 4 mg, Intravenous, Q6H PRN, Samuel Duane Kreis, MD, 4 mg at 06/10/18 2033  •  oxyCODONE-acetaminophen (PERCOCET) 7.5-325 MG per tablet 1 tablet, 1 tablet, Oral, Q6H PRN, Samuel Duane Kreis, MD, 1 tablet at 06/11/18 0512  •  pantoprazole (PROTONIX) EC tablet 40 mg, 40 mg, Oral, Daily, Samuel Duane Kreis, MD  •  rosuvastatin (CRESTOR) tablet 20 mg, 20 mg, Oral, Nightly, Samuel Duane Kreis, MD, 20 mg at 06/10/18 2216  •  Insert peripheral IV, , , Once **AND** sodium chloride 0.9 % flush 10 mL, 10 mL, Intravenous, PRN, Debbie Gilliam, DO  •  venlafaxine (EFFEXOR) tablet 75 mg, 75 mg, Oral, BID, Samuel Duane Kreis, MD, 75 mg at 06/10/18 221      Assessment/Plan   Pancytopenia  Possible tick borne illness  UTI  DMII  HTN  Chronic low back pain  Peripheral  neuropathy  Fever    Continue medical monitoring    Awaiting tick borne illness panel    Continue with rocephin + Doxycycline    Continue with metformin + glipizide + levemir + S/S insulin for blood sugar regulation    Percocet 7.5 mg q 6 hours prn pain    Monitor menatal status and adjust medications accordingly    Consider ID and/or hem/onc consultation if not improvng    D/C lovenox secondary to drop in PLTs overnight, start Teds and SCDs    CBC, BMP daily    GARIMA Conteh  06/11/18  7:40 AM     She is seen this morning by me and I agree with the above note.  She continues to have a poor appetite and is hurting severely in her legs.  She continues to have some memory related issues.  She had a fever last night.  She denies any abdominal discomfort.  She is having some diarrhea.  Platelet count has dropped a little further to 55,000.  I continue to feel that this is likely a tick borne illness.  Peripheral smear is pending.  Continue Rocephin for UTI and doxycycline for probable tick borne illness    Samuel Duane Kreis, MD  06/11/18  4:10 PM

## 2018-06-11 NOTE — H&P
"      Chief complaint   Chief Complaint   Patient presents with   • Diarrhea       Subjective     Patient is a 62 y.o. female presents To the emergency department with multiple complaints.  She is not a very good historian.  It seems that about a week ago she started feeling bad with having \"spasms\" in her legs, right greater than left.  It felt like her right ankle was having spasm involved with her vagina.  She states that he got progressively worse than she started having body aches and just feeling bad.  She denies any rash.  A couple of nights ago she started having profound night sweats, drenching the left now started happening during the daytime as well.  Over the last few days she has developed profound nausea with vomiting and diarrhea.  She went to the emergency room and [place] on Friday.  She was diagnosed with urinary tract infection.  Apparently urine there showed too numerous to count white blood cells and 4+ bacteria.  She states that she does not have any bladder sensation nor does she have any consistent control of her bowel movements since lumbar spine surgery multiple years ago caused her to be a partial paraplegic.  However, she is able to ambulate with no assistive device after extensive rehabilitation.  She does take chronic pain medication.  She does admit to having dogs and has tick bites quite often, most recently one was pulled off of her back within the past 2 weeks.  She does not know if it was engorged because someone else pulled off with tweezers.  She has not had any rash or irritation around the tick bite site.  She denies any shortness of breath, headache, rash, swelling of any joints.  She was told the other night in the emergency department that her blood counts were a little bit low    Review of Systems   On review of systems the patient denies the following unless noted above:     Constitutional:  Fevers, chills, weight change, fatigue     Eyes: Vision changes, headache, double " vision, loss of vision     ENT: Runny nose, nose bleeds, ringing in ears, pain with swallowing, sore throat     Cardiovascular: Chest pains, palpitations, PND, orthopnea     Respiratory: Cough, wheezing, SOA, hemoptysis     GI:  Abdominal pain, diarrhea, constipation, change in stool caliber,    Rectal bleeding, vomiting or nausea     : Difficulty voiding, dysuria, hematuria     Musculoskeletal: Changes of any chronic joint pain, swelling     Skin: Rash, itching, easy bruisability     Neurological: Unilateral weakness, new onset numbness, speech difficulties     Psychiatric: Sadness, tearfulness, feelings of hopelessness, racing thoughts     Endocrine:  Heat or cold intolerance, mood swings, polydipsia, polyphagia,    recent hypoglycemia      History  Past Medical History:   Diagnosis Date   • Arthritis    • Depression    • Diabetes mellitus    • Hyperlipidemia    • Hypertension    • Incontinence     B&B   • Infectious viral hepatitis    • Kidney stone      Past Surgical History:   Procedure Laterality Date   • APPENDECTOMY     • CHOLECYSTECTOMY     • EYE SURGERY     • HEMORRHOIDECTOMY     • SPINE SURGERY      lumbar   • TEMPOROMANDIBULAR JOINT SURGERY     • TUBAL ABDOMINAL LIGATION       Family History   Problem Relation Age of Onset   • Diabetes Mother    • Heart disease Father      Social History   Substance Use Topics   • Smoking status: Former Smoker   • Smokeless tobacco: Not on file   • Alcohol use No     Prescriptions Prior to Admission   Medication Sig Dispense Refill Last Dose   • amitriptyline (ELAVIL) 25 MG tablet Take 25 mg by mouth Every Night.   Past Week at Unknown time   • baclofen (LIORESAL) 10 MG tablet Take 10 mg by mouth 3 (Three) Times a Day.   Past Week at Unknown time   • Empagliflozin (JARDIANCE) 25 MG tablet Take 25 mg by mouth Daily.   Past Week at Unknown time   • gabapentin (NEURONTIN) 800 MG tablet Take 800 mg by mouth 3 (Three) Times a Day.   Past Week at Unknown time   • glipiZIDE  (GLUCOTROL) 10 MG tablet Take 10 mg by mouth 2 (Two) Times a Day Before Meals.   Past Week at Unknown time   • metFORMIN (GLUCOPHAGE) 1000 MG tablet Take 1,000 mg by mouth 2 (Two) Times a Day With Meals.   Past Week at Unknown time   • metoprolol succinate XL (TOPROL-XL) 25 MG 24 hr tablet Take 25 mg by mouth Daily.   Past Week at Unknown time   • ondansetron (ZOFRAN) 4 MG tablet Take 4 mg by mouth Every 8 (Eight) Hours As Needed for Nausea or Vomiting.   6/10/2018 at am   • oxyCODONE-acetaminophen (PERCOCET) 7.5-325 MG per tablet Take 1 tablet by mouth Every 6 (Six) Hours As Needed.   6/9/2018 at Unknown time   • pantoprazole (PROTONIX) 40 MG EC tablet Take 40 mg by mouth Daily.   Past Week at Unknown time   • rosuvastatin (CRESTOR) 20 MG tablet Take 20 mg by mouth Every Night.   Past Week at Unknown time   • TOUJEO SOLOSTAR 300 UNIT/ML solution pen-injector Inject 58 Units under the skin Daily.   6/9/2018 at am   • venlafaxine (EFFEXOR) 75 MG tablet Take 75 mg by mouth 2 (Two) Times a Day.   Past Week at Unknown time     Allergies:  Bee venom    Scheduled Meds:  amitriptyline 25 mg Oral Nightly   baclofen 10 mg Oral TID   [START ON 6/11/2018] ceftriaxone 1 g Intravenous Q24H   [START ON 6/11/2018] doxycycline 100 mg Intravenous Q12H   enoxaparin 40 mg Subcutaneous Q24H   gabapentin 800 mg Oral Q8H   [START ON 6/11/2018] glipiZIDE 10 mg Oral BID AC   insulin aspart 0-9 Units Subcutaneous 4x Daily AC & at Bedtime   [START ON 6/11/2018] insulin detemir 58 Units Subcutaneous Daily   metFORMIN 1,000 mg Oral BID With Meals   [START ON 6/11/2018] metoprolol succinate XL 25 mg Oral Daily   [START ON 6/11/2018] pantoprazole 40 mg Oral Daily   rosuvastatin 20 mg Oral Nightly   venlafaxine 75 mg Oral BID     Continuous Infusions:  dextrose 5 % and sodium chloride 0.45 % 100 mL/hr Last Rate: 100 mL/hr (06/10/18 2033)     PRN Meds:.•  acetaminophen  •  dextrose  •  dextrose  •  glucagon (human recombinant)  •  nitroglycerin  •  " ondansetron  •  oxyCODONE-acetaminophen  •  Insert peripheral IV **AND** sodium chloride          Objective     Vital Signs    /72 (BP Location: Right arm, Patient Position: Lying)   Pulse 92   Temp 100.2 °F (37.9 °C) (Oral)   Resp 16   Ht 162.6 cm (64\")   Wt 86.2 kg (190 lb)   SpO2 95%   BMI 32.61 kg/m²         Physical Exam:   General Appearance: alert, pleasant, appears stated age, interactive and   cooperative.  She is a little bit clammy.    Head: normocephalic, without obvious abnormality and atraumatic   Eyes: lids and lashes normal, conjunctivae and sclerae normal, no icterus, no   pallor, corneas clear and PERRLA   Ears: ears appear intact with no abnormalities noted   Nose: nares normal, septum midline, mucosa normal and no drainage   Throat: no oral lesions, no thrush, oral mucosa moist and oopharynx normal   Neck: no adenopathy, supple, trachea midline, no thyromegaly, no carotid bruit   and no JVD   Back: no kyphosis present, no scoliosis present, no skin lesions, erythema, or   scars, no tenderness to percussion or palpation and range of motion normal   Lungs: clear to auscultation, respirations regular, respirations even and    respirations unlabored. No accessory muscle use.    Heart:: regular rhythm & normal rate, normal S1, S2, no murmur, no gallop, no   rub and no click.  Chest wall with no abnormalities observed. PMI nondisplaced   Abdomen: normal bowel sounds in all quadrants, no masses, no hepatomegaly,   no splenomegaly, soft non-tender, no guarding and no rebound tenderness   Extremities: no edema, no cyanosis, no redness, no tenderness, no clubbing   Musculoskeletal: joints with full range of motion and joints, no effusion.  Pedal   pulses palpable and equal bilaterally   Skin: no bleeding, bruising or rash and no lesions noted   Lymph Nodes: no palpable adenopathy   Neurologic: Mental Status orientated to person, place, time and situation.    Speech is intelligible, " Nonlabored.  Alertness alert and awake and mood/affect   normal, Cranial Nerves 2 - 12 grossly intact as examined   Sensory intact in BLE and BUE.   Motor strength  LUE is 5/5 proximally, 5/5 distally      RUE is 5/5 proximally, 5/5 distally      LLE is 5/5 @ hip flexors, quads as well as       dorsiflexion / plantar flexion      RLE is 5/5 @ hip flexors, quads as well as        dosriflexion / plantar flexion   Reflexes: Right:  2+ biceps, 2+ brachioradialis      2+ patella, 2+ achilles     Left: 2+ biceps, 2+ brachioradialis      2+ patella, 2+ achilles    Results Review:   Lab Results (last 24 hours)     Procedure Component Value Units Date/Time    Clostridium Difficile Toxin - Stool, Per Rectum [[id number]] Collected:  06/10/18 2143    Specimen:  Stool from Per Rectum Updated:  06/10/18 2248    Narrative:       The following orders were created for panel order Clostridium Difficile Toxin - Stool, Per Rectum.  Procedure                               Abnormality         Status                     ---------                               -----------         ------                     Clostridium Difficile To...[[id number]]                      Final result                 Please view results for these tests on the individual orders.    Clostridium Difficile Toxin, PCR - Stool, Per Rectum [[id number]] Collected:  06/10/18 2143    Specimen:  Stool from Per Rectum Updated:  06/10/18 2248     C. Difficile Toxins by PCR Negative     027 Toxin Presumptive Negative    Narrative:         For In Vitro Diagnostic use only.  027-NAP1-BI results are NOT intended to guide treatment. 027 typing is relative to PCR ribotyping and shown to be equivalent to B1 typing by restriction endonuclease analysis or NAP1 typing by pulse field gel electrophoresis.    Fecal Lactoferrin - Stool, Per Rectum [[id number]]  (Normal) Collected:  06/10/18 2151    Specimen:  Stool from Per Rectum Updated:  06/10/18 2211     Lactoferrin, Qual Negative     Stool Culture - Stool, Per Rectum [[id number]] Collected:  06/10/18 2151    Specimen:  Stool from Per Rectum Updated:  06/10/18 2151    POC Glucose Once [[id number]]  (Normal) Collected:  06/10/18 1942    Specimen:  Blood Updated:  06/10/18 1948     Glucose 80 mg/dL     Narrative:       Meter: [id number] : [id number] cici cook    Ehrlichia Antibody Panel [[id number]] Collected:  06/10/18 1800    Specimen:  Blood from Arm, Right Updated:  06/10/18 1805    Rickettsia Typhi (Typhus Fever) Antibody [[id number]] Collected:  06/10/18 1800    Specimen:  Blood from Arm, Right Updated:  06/10/18 1805    Babesia Microti Antibody Panel [[id number]] Collected:  06/10/18 1800    Specimen:  Blood from Arm, Right Updated:  06/10/18 1805    Lyme, Total Antibody Test / Reflex [[id number]] Collected:  06/10/18 1800    Specimen:  Blood from Arm, Right Updated:  06/10/18 1805    Beulah SF (IgG / M) [[id number]] Collected:  06/10/18 1800    Specimen:  Blood from Arm, Right Updated:  06/10/18 1805    Tularemia Antibody [[id number]] Collected:  06/10/18 1800    Specimen:  Blood from Arm, Right Updated:  06/10/18 1805    Urinalysis, Microscopic Only - Urine, Clean Catch [[id number]]  (Abnormal) Collected:  06/10/18 1516    Specimen:  Urine from Urine, Clean Catch Updated:  06/10/18 1537     RBC, UA 7-12 (A) /HPF      WBC, UA 7-12 (A) /HPF      Bacteria, UA None Seen /HPF      Squamous Epithelial Cells, UA 3-6 (A) /HPF      Yeast, UA Small/1+ Yeast /HPF      Hyaline Casts, UA None Seen /LPF      Methodology Manual Light Microscopy    Urinalysis With / Culture If Indicated - Urine, Clean Catch [[id number]]  (Abnormal) Collected:  06/10/18 1516    Specimen:  Urine from Urine, Clean Catch Updated:  06/10/18 1531     Color, UA Yellow     Appearance, UA Cloudy (A)     pH, UA <=5.0     Specific Gravity, UA 1.027     Glucose, UA >=1000 mg/dL (3+) (A)     Ketones, UA 40 mg/dL (2+) (A)     Bilirubin, UA Negative     Blood, UA Small (1+) (A)      Protein,  mg/dL (2+) (A)     Leuk Esterase, UA Small (1+) (A)     Nitrite, UA Negative     Urobilinogen, UA 0.2 E.U./dL    BNP [[id number]]  (Normal) Collected:  06/10/18 1413    Specimen:  Blood from Arm, Left Updated:  06/10/18 1459     BNP 28.0 pg/mL     Troponin [[id number]]  (Normal) Collected:  06/10/18 1413    Specimen:  Blood from Arm, Left Updated:  06/10/18 1459     Troponin I 0.018 ng/mL     Narrative:       Ultra Troponin I Reference Range:         <=0.039 ng/mL: Negative    0.04-0.779 ng/mL: Indeterminate Range. Suspicious of MI.  Clinical correlation required.       >=0.78  ng/mL: Consistent with myocardial injury.  Clinical correlation required.    Comprehensive Metabolic Panel [[id number]]  (Abnormal) Collected:  06/10/18 1413    Specimen:  Blood from Arm, Left Updated:  06/10/18 1451     Glucose 112 (H) mg/dL      BUN 25 (H) mg/dL      Creatinine 1.19 mg/dL      Sodium 127 (L) mmol/L      Potassium 3.9 mmol/L      Chloride 96 (L) mmol/L      CO2 21.5 (L) mmol/L      Calcium 8.9 mg/dL      Total Protein 7.8 g/dL      Albumin 4.60 g/dL      ALT (SGPT) 55 (H) U/L      AST (SGOT) 90 (H) U/L      Alkaline Phosphatase 80 U/L      Comment: Note New Reference Ranges        Total Bilirubin 0.5 mg/dL      eGFR Non African Amer 46 (L) mL/min/1.73      Globulin 3.2 gm/dL      A/G Ratio 1.4 (L) g/dL      BUN/Creatinine Ratio 21.0     Anion Gap 9.5 mmol/L     Osmolality, Calculated [[id number]]  (Abnormal) Collected:  06/10/18 1413    Specimen:  Blood from Arm, Left Updated:  06/10/18 1451     Osmolality Calc 260.4 (L) mOsm/kg     Lipase [[id number]]  (Normal) Collected:  06/10/18 1413    Specimen:  Blood from Arm, Left Updated:  06/10/18 1450     Lipase 49 U/L     C-reactive Protein [[id number]]  (Abnormal) Collected:  06/10/18 1413    Specimen:  Blood from Arm, Left Updated:  06/10/18 1450     C-Reactive Protein 9.19 (H) mg/dL     Protime-INR [[id number]]  (Normal) Collected:  06/10/18 1413    Specimen:  Blood  from Arm, Left Updated:  06/10/18 1445     Protime 14.4 Seconds      Comment: Note new Reference Range        INR 1.10    Narrative:       Suggested INR therapeutic range for stable oral anticoagulant therapy:    Low Intensity therapy:   1.5-2.0  Moderate Intensity therapy:   2.0-3.0  High Intensity therapy:   2.5-4.0    aPTT [[id number]]  (Normal) Collected:  06/10/18 1413    Specimen:  Blood from Arm, Left Updated:  06/10/18 1445     PTT 28.3 seconds      Comment: Note new Reference Range       Narrative:       PTT Heparin Therapeutic Range:  59 - 95 seconds    Lactic Acid, Plasma [[id number]]  (Normal) Collected:  06/10/18 1408    Specimen:  Blood from Arm, Right Updated:  06/10/18 1444     Lactate 1.0 mmol/L     CBC & Differential [[id number]] Collected:  06/10/18 1413    Specimen:  Blood Updated:  06/10/18 1438    Narrative:       The following orders were created for panel order CBC & Differential.  Procedure                               Abnormality         Status                     ---------                               -----------         ------                     CBC Auto Differential[[id number]]        Abnormal            Final result                 Please view results for these tests on the individual orders.    CBC Auto Differential [[id number]]  (Abnormal) Collected:  06/10/18 1413    Specimen:  Blood from Arm, Left Updated:  06/10/18 1438     WBC 1.69 (C) 10*3/mm3      RBC 4.37 10*6/mm3      Hemoglobin 11.1 (L) g/dL      Hematocrit 35.0 (L) %      MCV 80.1 fL      MCH 25.4 (L) pg      MCHC 31.7 (L) g/dL      RDW 16.5 (H) %      RDW-SD 46.8 fl      MPV 10.4 (H) fL      Platelets 75 (L) 10*3/mm3      Neutrophil % 73.3 (H) %      Lymphocyte % 15.4 (L) %      Monocyte % 9.5 %      Eosinophil % 0.0 %      Basophil % 1.8 %      Immature Grans % 0.0 %      Neutrophils, Absolute 1.24 (L) 10*3/mm3      Lymphocytes, Absolute 0.26 (L) 10*3/mm3      Monocytes, Absolute 0.16 10*3/mm3      Eosinophils, Absolute 0.00  10*3/mm3      Basophils, Absolute 0.03 10*3/mm3      Immature Grans, Absolute 0.00 10*3/mm3     Blood Culture - Blood, [[id number]] Collected:  06/10/18 1408    Specimen:  Blood from Arm, Right Updated:  06/10/18 1430    Blood Culture - Blood, [[id number]] Collected:  06/10/18 1413    Specimen:  Blood from Arm, Left Updated:  06/10/18 1430    Blood Gas, Arterial [[id number]]  (Abnormal) Collected:  06/10/18 1414    Specimen:  Arterial Blood Updated:  06/10/18 1420     Site Arterial: left brachial     Vincent's Test N/A     pH, Arterial 7.449 pH units      pCO2, Arterial 27.3 (C) mm Hg      pO2, Arterial 72.5 (L) mm Hg      HCO3, Arterial 18.5 (C) mmol/L      Base Excess, Arterial -4.3 mmol/L      O2 Saturation, Arterial 94.2 %      Hemoglobin, Blood Gas 11.3 (L) g/dL      Hematocrit, Blood Gas 33.0 (L) %      Oxyhemoglobin 92.4 %      Methemoglobin 0.20 %      Carboxyhemoglobin 1.7 %      A-a Gradiant 37.4 mmHg      Temperature 98.6 C      Barometric Pressure for Blood Gas 726 mmHg      Modality Room Air     FIO2 21 %         Imaging Results (last 24 hours)     Procedure Component Value Units Date/Time    XR Chest 1 View [[id number]] Collected:  06/10/18 2132     Updated:  06/10/18 2134    Narrative:       XR CHEST 1 VW-     CLINICAL INDICATION: soa          COMPARISON: None available      TECHNIQUE: Single frontal view of the chest.     FINDINGS:     There is no focal alveolar infiltrate or effusion.  The cardiac silhouette is normal. The pulmonary vasculature is  unremarkable.  There is no evidence of an acute osseous abnormality.   There are no suspicious-appearing parenchymal soft tissue nodules.            Impression:       No evidence of active or acute cardiopulmonary disease on today's chest  radiograph.         This report was finalized on 6/10/2018 9:32 PM by Dr. Vlad Squires MD.       CT Abdomen Pelvis With Contrast [[id number]] Collected:  06/10/18 2131     Updated:  06/10/18 2134    Narrative:       CT ABDOMEN  PELVIS W CONTRAST-     CLINICAL INDICATION: uti          COMPARISON: None available     TECHNIQUE: Axial images were acquired from the lung bases through the  pubic symphysis after IV , but without oral contrast.  Reformatted images were created in both the coronal and sagittal planes.     Radiation dose reduction techniques were utilized per ALARA protocol.  Automated exposure control was initiated through either or [id number] or  [employer] software packages by  protocol.           FINDINGS:   The lung bases are clear. There are no pleural effusions.     The liver is homogeneous. There is no evidence of focal hepatic mass     The spleen is homogeneous     There is no peripancreatic stranding or pancreatic head mass.     Small left adrenal nodule is present measuring 6.7 mm.     The kidneys show no evidence of hydronephrosis or hydroureter. I do not  see any distal ureteral stones.      Otherwise I do not see any free fluid or walled off fluid collections.     There is no bowel wall thickening or mesenteric stranding.     Postoperative changes seen in the lumbar spine. There is anterolisthesis  of L5 with respect to S1.       Impression:       : Small left adrenal nodule  The urinary bladder wall does not appear to be thickened on today's  study.                   This report was finalized on 6/10/2018 9:32 PM by Dr. Vlad Squires MD.             Assessment/Plan     Pancytopenia with recent tick bite  Urinary tract infection having taken Macrodantin for 3 days  Chronic lower back pain with history of lumbar spine surgery with partial paraplegia  Type 2 diabetes mellitus  Hypertension    She'll be admitted to observation.  I suspect that her pancytopenia most notably with neutropenia and thrombocytopenia is likely related to a tick borne illness.  I will obtain a peripheral smear.  I spoke with the emergency room physician and asked that tick borne illness panel be sent specifically for Lyme disease and  ehrlichiosis. This panel includes antibodies.  It's possible that these could be negative since we are in the acute phase of illness.  Start Rocephin for treatment of UTI.  Start IV doxycycline for treatment of tick borne illness.    Accu-Cheks before meals and at bedtime.  Moderate dose sliding scale insulin.  Continue home regimen    Continue metoprolol    Continue chronic pain management    Lovenox for DVT prophylaxis        Samuel Duane Kreis, MD  06/10/18  11:07 PM

## 2018-06-11 NOTE — PLAN OF CARE
Problem: Fall Risk (Adult)  Goal: Absence of Fall  Outcome: Ongoing (interventions implemented as appropriate)      Problem: Pain, Acute (Adult)  Goal: Acceptable Pain Control/Comfort Level  Outcome: Ongoing (interventions implemented as appropriate)

## 2018-06-12 VITALS
TEMPERATURE: 97.7 F | OXYGEN SATURATION: 96 % | SYSTOLIC BLOOD PRESSURE: 105 MMHG | HEART RATE: 85 BPM | HEIGHT: 64 IN | DIASTOLIC BLOOD PRESSURE: 68 MMHG | BODY MASS INDEX: 30.78 KG/M2 | RESPIRATION RATE: 16 BRPM | WEIGHT: 180.3 LBS

## 2018-06-12 LAB
ANION GAP SERPL CALCULATED.3IONS-SCNC: 8 MMOL/L (ref 3.6–11.2)
B BURGDOR IGG+IGM SER-ACNC: <0.91 ISR (ref 0–0.9)
BASOPHILS # BLD MANUAL: 0.05 10*3/MM3 (ref 0–0.3)
BASOPHILS NFR BLD AUTO: 2 % (ref 0–2)
BUN BLD-MCNC: 18 MG/DL (ref 7–21)
BUN/CREAT SERPL: 18.2 (ref 7–25)
CALCIUM SPEC-SCNC: 8.4 MG/DL (ref 7.7–10)
CHLORIDE SERPL-SCNC: 107 MMOL/L (ref 99–112)
CO2 SERPL-SCNC: 19 MMOL/L (ref 24.3–31.9)
CREAT BLD-MCNC: 0.99 MG/DL (ref 0.43–1.29)
CYTOLOGIST CVX/VAG CYTO: NORMAL
DEPRECATED RDW RBC AUTO: 50.5 FL (ref 37–54)
ERYTHROCYTE [DISTWIDTH] IN BLOOD BY AUTOMATED COUNT: 16.9 % (ref 11.5–14.5)
GFR SERPL CREATININE-BSD FRML MDRD: 57 ML/MIN/1.73
GLUCOSE BLD-MCNC: 141 MG/DL (ref 70–110)
GLUCOSE BLDC GLUCOMTR-MCNC: 165 MG/DL (ref 70–130)
GLUCOSE BLDC GLUCOMTR-MCNC: 60 MG/DL (ref 70–130)
HCT VFR BLD AUTO: 34 % (ref 37–47)
HGB BLD-MCNC: 10.4 G/DL (ref 12–16)
LYMPHOCYTES # BLD MANUAL: 1.3 10*3/MM3 (ref 1–3)
LYMPHOCYTES NFR BLD MANUAL: 54 % (ref 21–51)
LYMPHOCYTES NFR BLD MANUAL: 8 % (ref 0–10)
MCH RBC QN AUTO: 25.1 PG (ref 27–33)
MCHC RBC AUTO-ENTMCNC: 30.6 G/DL (ref 33–37)
MCV RBC AUTO: 81.9 FL (ref 80–94)
MONOCYTES # BLD AUTO: 0.19 10*3/MM3 (ref 0.1–0.9)
NEUTROPHILS # BLD AUTO: 0.87 10*3/MM3 (ref 1.4–6.5)
NEUTROPHILS NFR BLD MANUAL: 34 % (ref 30–70)
NEUTS BAND NFR BLD MANUAL: 2 % (ref 4–12)
OSMOLALITY SERPL CALC.SUM OF ELEC: 272.5 MOSM/KG (ref 273–305)
PATH INTERP BLD-IMP: NORMAL
PLAT MORPH BLD: NORMAL
PLATELET # BLD AUTO: 56 10*3/MM3 (ref 130–400)
PMV BLD AUTO: 11.4 FL (ref 6–10)
POTASSIUM BLD-SCNC: 3.4 MMOL/L (ref 3.5–5.3)
RBC # BLD AUTO: 4.15 10*6/MM3 (ref 4.2–5.4)
RBC MORPH BLD: NORMAL
SCAN SLIDE: NORMAL
SODIUM BLD-SCNC: 134 MMOL/L (ref 135–153)
WBC NRBC COR # BLD: 2.41 10*3/MM3 (ref 4.5–12.5)

## 2018-06-12 PROCEDURE — 85025 COMPLETE CBC W/AUTO DIFF WBC: CPT | Performed by: PHYSICIAN ASSISTANT

## 2018-06-12 PROCEDURE — 82962 GLUCOSE BLOOD TEST: CPT

## 2018-06-12 PROCEDURE — 85007 BL SMEAR W/DIFF WBC COUNT: CPT | Performed by: PHYSICIAN ASSISTANT

## 2018-06-12 PROCEDURE — 80048 BASIC METABOLIC PNL TOTAL CA: CPT | Performed by: PHYSICIAN ASSISTANT

## 2018-06-12 RX ORDER — DOXYCYCLINE 100 MG/1
100 CAPSULE ORAL EVERY 12 HOURS SCHEDULED
Qty: 42 CAPSULE | Refills: 0 | Status: SHIPPED | OUTPATIENT
Start: 2018-06-12 | End: 2018-07-03

## 2018-06-12 RX ADMIN — DEXTROSE AND SODIUM CHLORIDE 100 ML/HR: 5; 450 INJECTION, SOLUTION INTRAVENOUS at 01:53

## 2018-06-12 RX ADMIN — VENLAFAXINE HYDROCHLORIDE 75 MG: 37.5 TABLET ORAL at 09:09

## 2018-06-12 RX ADMIN — PANTOPRAZOLE SODIUM 40 MG: 40 TABLET, DELAYED RELEASE ORAL at 09:08

## 2018-06-12 RX ADMIN — DOXYCYCLINE 100 MG: 100 INJECTION, POWDER, LYOPHILIZED, FOR SOLUTION INTRAVENOUS at 05:11

## 2018-06-12 RX ADMIN — METOPROLOL SUCCINATE 25 MG: 25 TABLET, FILM COATED, EXTENDED RELEASE ORAL at 09:08

## 2018-06-12 RX ADMIN — GLIPIZIDE 10 MG: 5 TABLET ORAL at 09:09

## 2018-06-12 RX ADMIN — GABAPENTIN 800 MG: 400 CAPSULE ORAL at 05:11

## 2018-06-12 RX ADMIN — BACLOFEN 10 MG: 10 TABLET ORAL at 09:08

## 2018-06-12 RX ADMIN — OXYCODONE HYDROCHLORIDE AND ACETAMINOPHEN 1 TABLET: 7.5; 325 TABLET ORAL at 03:19

## 2018-06-12 RX ADMIN — OXYCODONE HYDROCHLORIDE AND ACETAMINOPHEN 1 TABLET: 7.5; 325 TABLET ORAL at 09:08

## 2018-06-12 RX ADMIN — METFORMIN HYDROCHLORIDE 1000 MG: 500 TABLET ORAL at 09:09

## 2018-06-12 NOTE — PAYOR COMM NOTE
"[facility]  NPI:[id number]    Utilization Review  Contact: Yamileth Sandra RN  Phone: [phone]  Fax:[phone]    INITIATE INPATIENT AUTHORIZATION  PLEASE NOTE PATIENT CAME IN ON 6/10/18 IN OBSERVATION AND CONVERTED TO INPATIENT ON 6/11/18    Erasmo Easley (62 y.o. Female)     Date of Birth Social Security Number Address Home Phone MRN    1955  [address] [facility] [address] [phone] [id number]    Catholic Marital Status          None        Admission Date Admission Type Admitting Provider Attending Provider Department, Room/Bed    6/10/18 Emergency Kreis, Samuel Duane, MD Kreis, Samuel Duane, MD [address], [address]    Discharge Date Discharge Disposition Discharge Destination                       Attending Provider:  Samuel Duane Kreis, MD    Allergies:  Bee Venom    Isolation:  None   Infection:  None   Code Status:  FULL    Ht:  162.6 cm (64\")   Wt:  81.8 kg (180 lb 4.8 oz)    Admission Cmt:  None   Principal Problem:  None                Active Insurance as of 6/10/2018     Primary Coverage     Payor Plan Insurance Group Employer/Plan Group    HUMANA MEDICARE REPLACEMENT HUMANA MEDICARE REPL [id number]     Payor Plan Address Payor Plan Phone Number Effective From Effective To    [address] [address] [phone] 1/1/2018     Formerly [facility] [id number]       Subscriber Name Subscriber Birth Date Member ID       ERASMO EASLEY 1955 [id number]                 Emergency Contacts      (Rel.) Home Phone Work Phone Mobile Phone    Jax Easley (Spouse) [phone] -- --    Yadi Cooper (Sister) [phone] -- --               History & Physical      Samuel Duane Kreis, MD at 6/10/2018 11:07 PM                Chief complaint   Chief Complaint   Patient presents with   • Diarrhea       Subjective     Patient is a 62 y.o. female presents To the emergency department with multiple complaints.  She is not a very good historian.  It " "seems that about a week ago she started feeling bad with having \"spasms\" in her legs, right greater than left.  It felt like her right ankle was having spasm involved with her vagina.  She states that he got progressively worse than she started having body aches and just feeling bad.  She denies any rash.  A couple of nights ago she started having profound night sweats, drenching the left now started happening during the daytime as well.  Over the last few days she has developed profound nausea with vomiting and diarrhea.  She went to the emergency room and [facility] on Friday.  She was diagnosed with urinary tract infection.  Apparently urine there showed too numerous to count white blood cells and 4+ bacteria.  She states that she does not have any bladder sensation nor does she have any consistent control of her bowel movements since lumbar spine surgery multiple years ago caused her to be a partial paraplegic.  However, she is able to ambulate with no assistive device after extensive rehabilitation.  She does take chronic pain medication.  She does admit to having dogs and has tick bites quite often, most recently one was pulled off of her back within the past 2 weeks.  She does not know if it was engorged because someone else pulled off with tweezers.  She has not had any rash or irritation around the tick bite site.  She denies any shortness of breath, headache, rash, swelling of any joints.  She was told the other night in the emergency department that her blood counts were a little bit low    Review of Systems   On review of systems the patient denies the following unless noted above:     Constitutional:  Fevers, chills, weight change, fatigue     Eyes: Vision changes, headache, double vision, loss of vision     ENT: Runny nose, nose bleeds, ringing in ears, pain with swallowing, sore throat     Cardiovascular: Chest pains, palpitations, PND, orthopnea     Respiratory: Cough, wheezing, SOA, hemoptysis     GI:  " Abdominal pain, diarrhea, constipation, change in stool caliber,    Rectal bleeding, vomiting or nausea     : Difficulty voiding, dysuria, hematuria     Musculoskeletal: Changes of any chronic joint pain, swelling     Skin: Rash, itching, easy bruisability     Neurological: Unilateral weakness, new onset numbness, speech difficulties     Psychiatric: Sadness, tearfulness, feelings of hopelessness, racing thoughts     Endocrine:  Heat or cold intolerance, mood swings, polydipsia, polyphagia,    recent hypoglycemia      History  Past Medical History:   Diagnosis Date   • Arthritis    • Depression    • Diabetes mellitus    • Hyperlipidemia    • Hypertension    • Incontinence     B&B   • Infectious viral hepatitis    • Kidney stone      Past Surgical History:   Procedure Laterality Date   • APPENDECTOMY     • CHOLECYSTECTOMY     • EYE SURGERY     • HEMORRHOIDECTOMY     • SPINE SURGERY      lumbar   • TEMPOROMANDIBULAR JOINT SURGERY     • TUBAL ABDOMINAL LIGATION       Family History   Problem Relation Age of Onset   • Diabetes Mother    • Heart disease Father      Social History   Substance Use Topics   • Smoking status: Former Smoker   • Smokeless tobacco: Not on file   • Alcohol use No     Prescriptions Prior to Admission   Medication Sig Dispense Refill Last Dose   • amitriptyline (ELAVIL) 25 MG tablet Take 25 mg by mouth Every Night.   Past Week at Unknown time   • baclofen (LIORESAL) 10 MG tablet Take 10 mg by mouth 3 (Three) Times a Day.   Past Week at Unknown time   • Empagliflozin (JARDIANCE) 25 MG tablet Take 25 mg by mouth Daily.   Past Week at Unknown time   • gabapentin (NEURONTIN) 800 MG tablet Take 800 mg by mouth 3 (Three) Times a Day.   Past Week at Unknown time   • glipiZIDE (GLUCOTROL) 10 MG tablet Take 10 mg by mouth 2 (Two) Times a Day Before Meals.   Past Week at Unknown time   • metFORMIN (GLUCOPHAGE) 1000 MG tablet Take 1,000 mg by mouth 2 (Two) Times a Day With Meals.   Past Week at Unknown  time   • metoprolol succinate XL (TOPROL-XL) 25 MG 24 hr tablet Take 25 mg by mouth Daily.   Past Week at Unknown time   • ondansetron (ZOFRAN) 4 MG tablet Take 4 mg by mouth Every 8 (Eight) Hours As Needed for Nausea or Vomiting.   6/10/2018 at am   • oxyCODONE-acetaminophen (PERCOCET) 7.5-325 MG per tablet Take 1 tablet by mouth Every 6 (Six) Hours As Needed.   6/9/2018 at Unknown time   • pantoprazole (PROTONIX) 40 MG EC tablet Take 40 mg by mouth Daily.   Past Week at Unknown time   • rosuvastatin (CRESTOR) 20 MG tablet Take 20 mg by mouth Every Night.   Past Week at Unknown time   • TOUJEO SOLOSTAR 300 UNIT/ML solution pen-injector Inject 58 Units under the skin Daily.   6/9/2018 at am   • venlafaxine (EFFEXOR) 75 MG tablet Take 75 mg by mouth 2 (Two) Times a Day.   Past Week at Unknown time     Allergies:  Bee venom    Scheduled Meds:  amitriptyline 25 mg Oral Nightly   baclofen 10 mg Oral TID   [START ON 6/11/2018] ceftriaxone 1 g Intravenous Q24H   [START ON 6/11/2018] doxycycline 100 mg Intravenous Q12H   enoxaparin 40 mg Subcutaneous Q24H   gabapentin 800 mg Oral Q8H   [START ON 6/11/2018] glipiZIDE 10 mg Oral BID AC   insulin aspart 0-9 Units Subcutaneous 4x Daily AC & at Bedtime   [START ON 6/11/2018] insulin detemir 58 Units Subcutaneous Daily   metFORMIN 1,000 mg Oral BID With Meals   [START ON 6/11/2018] metoprolol succinate XL 25 mg Oral Daily   [START ON 6/11/2018] pantoprazole 40 mg Oral Daily   rosuvastatin 20 mg Oral Nightly   venlafaxine 75 mg Oral BID     Continuous Infusions:  dextrose 5 % and sodium chloride 0.45 % 100 mL/hr Last Rate: 100 mL/hr (06/10/18 2033)     PRN Meds:.•  acetaminophen  •  dextrose  •  dextrose  •  glucagon (human recombinant)  •  nitroglycerin  •  ondansetron  •  oxyCODONE-acetaminophen  •  Insert peripheral IV **AND** sodium chloride          Objective     Vital Signs    /72 (BP Location: Right arm, Patient Position: Lying)   Pulse 92   Temp 100.2 °F (37.9 °C)  "(Oral)   Resp 16   Ht 162.6 cm (64\")   Wt 86.2 kg (190 lb)   SpO2 95%   BMI 32.61 kg/m²          Physical Exam:   General Appearance: alert, pleasant, appears stated age, interactive and   cooperative.  She is a little bit clammy.    Head: normocephalic, without obvious abnormality and atraumatic   Eyes: lids and lashes normal, conjunctivae and sclerae normal, no icterus, no   pallor, corneas clear and PERRLA   Ears: ears appear intact with no abnormalities noted   Nose: nares normal, septum midline, mucosa normal and no drainage   Throat: no oral lesions, no thrush, oral mucosa moist and oopharynx normal   Neck: no adenopathy, supple, trachea midline, no thyromegaly, no carotid bruit   and no JVD   Back: no kyphosis present, no scoliosis present, no skin lesions, erythema, or   scars, no tenderness to percussion or palpation and range of motion normal   Lungs: clear to auscultation, respirations regular, respirations even and    respirations unlabored. No accessory muscle use.    Heart:: regular rhythm & normal rate, normal S1, S2, no murmur, no gallop, no   rub and no click.  Chest wall with no abnormalities observed. PMI nondisplaced   Abdomen: normal bowel sounds in all quadrants, no masses, no hepatomegaly,   no splenomegaly, soft non-tender, no guarding and no rebound tenderness   Extremities: no edema, no cyanosis, no redness, no tenderness, no clubbing   Musculoskeletal: joints with full range of motion and joints, no effusion.  Pedal   pulses palpable and equal bilaterally   Skin: no bleeding, bruising or rash and no lesions noted   Lymph Nodes: no palpable adenopathy   Neurologic: Mental Status orientated to person, place, time and situation.    Speech is intelligible, Nonlabored.  Alertness alert and awake and mood/affect   normal, Cranial Nerves 2 - 12 grossly intact as examined   Sensory intact in BLE and BUE.   Motor strength  LUE is 5/5 proximally, 5/5 distally      RUE is 5/5 proximally, 5/5 " distally      LLE is 5/5 @ hip flexors, quads as well as       dorsiflexion / plantar flexion      RLE is 5/5 @ hip flexors, quads as well as        dosriflexion / plantar flexion   Reflexes: Right:  2+ biceps, 2+ brachioradialis      2+ patella, 2+ achilles     Left: 2+ biceps, 2+ brachioradialis      2+ patella, 2+ achilles    Results Review:   Lab Results (last 24 hours)     Procedure Component Value Units Date/Time    Clostridium Difficile Toxin - Stool, Per Rectum [[id number]] Collected:  06/10/18 2143    Specimen:  Stool from Per Rectum Updated:  06/10/18 2248    Narrative:       The following orders were created for panel order Clostridium Difficile Toxin - Stool, Per Rectum.  Procedure                               Abnormality         Status                     ---------                               -----------         ------                     Clostridium Difficile To...[[id number]]                      Final result                 Please view results for these tests on the individual orders.    Clostridium Difficile Toxin, PCR - Stool, Per Rectum [[id number]] Collected:  06/10/18 2143    Specimen:  Stool from Per Rectum Updated:  06/10/18 2248     C. Difficile Toxins by PCR Negative     027 Toxin Presumptive Negative    Narrative:         For In Vitro Diagnostic use only.  027-NAP1-BI results are NOT intended to guide treatment. 027 typing is relative to PCR ribotyping and shown to be equivalent to B1 typing by restriction endonuclease analysis or NAP1 typing by pulse field gel electrophoresis.    Fecal Lactoferrin - Stool, Per Rectum [[id number]]  (Normal) Collected:  06/10/18 2151    Specimen:  Stool from Per Rectum Updated:  06/10/18 2211     Lactoferrin, Qual Negative    Stool Culture - Stool, Per Rectum [[id number]] Collected:  06/10/18 2151    Specimen:  Stool from Per Rectum Updated:  06/10/18 2151    POC Glucose Once [[id number]]  (Normal) Collected:  06/10/18 1942    Specimen:  Blood Updated:   06/10/18 1948     Glucose 80 mg/dL     Narrative:       Meter: [id number] : [id number] cici cook    Ehrlichia Antibody Panel [[id number]] Collected:  06/10/18 1800    Specimen:  Blood from Arm, Right Updated:  06/10/18 1805    Rickettsia Typhi (Typhus Fever) Antibody [[id number]] Collected:  06/10/18 1800    Specimen:  Blood from Arm, Right Updated:  06/10/18 1805    Babesia Microti Antibody Panel [[id number]] Collected:  06/10/18 1800    Specimen:  Blood from Arm, Right Updated:  06/10/18 1805    Lyme, Total Antibody Test / Reflex [[id number]] Collected:  06/10/18 1800    Specimen:  Blood from Arm, Right Updated:  06/10/18 1805    [place]burn SF (IgG / M) [[id number]] Collected:  06/10/18 1800    Specimen:  Blood from Arm, Right Updated:  06/10/18 1805    Tularemia Antibody [[id number]] Collected:  06/10/18 1800    Specimen:  Blood from Arm, Right Updated:  06/10/18 1805    Urinalysis, Microscopic Only - Urine, Clean Catch [[id number]]  (Abnormal) Collected:  06/10/18 1516    Specimen:  Urine from Urine, Clean Catch Updated:  06/10/18 1537     RBC, UA 7-12 (A) /HPF      WBC, UA 7-12 (A) /HPF      Bacteria, UA None Seen /HPF      Squamous Epithelial Cells, UA 3-6 (A) /HPF      Yeast, UA Small/1+ Yeast /HPF      Hyaline Casts, UA None Seen /LPF      Methodology Manual Light Microscopy    Urinalysis With / Culture If Indicated - Urine, Clean Catch [[id number]]  (Abnormal) Collected:  06/10/18 1516    Specimen:  Urine from Urine, Clean Catch Updated:  06/10/18 1531     Color, UA Yellow     Appearance, UA Cloudy (A)     pH, UA <=5.0     Specific Gravity, UA 1.027     Glucose, UA >=1000 mg/dL (3+) (A)     Ketones, UA 40 mg/dL (2+) (A)     Bilirubin, UA Negative     Blood, UA Small (1+) (A)     Protein,  mg/dL (2+) (A)     Leuk Esterase, UA Small (1+) (A)     Nitrite, UA Negative     Urobilinogen, UA 0.2 E.U./dL    BNP [[id number]]  (Normal) Collected:  06/10/18 1413    Specimen:  Blood from Arm, Left Updated:   06/10/18 1459     BNP 28.0 pg/mL     Troponin [[id number]]  (Normal) Collected:  06/10/18 1413    Specimen:  Blood from Arm, Left Updated:  06/10/18 1459     Troponin I 0.018 ng/mL     Narrative:       Ultra Troponin I Reference Range:         <=0.039 ng/mL: Negative    0.04-0.779 ng/mL: Indeterminate Range. Suspicious of MI.  Clinical correlation required.       >=0.78  ng/mL: Consistent with myocardial injury.  Clinical correlation required.    Comprehensive Metabolic Panel [[id number]]  (Abnormal) Collected:  06/10/18 1413    Specimen:  Blood from Arm, Left Updated:  06/10/18 1451     Glucose 112 (H) mg/dL      BUN 25 (H) mg/dL      Creatinine 1.19 mg/dL      Sodium 127 (L) mmol/L      Potassium 3.9 mmol/L      Chloride 96 (L) mmol/L      CO2 21.5 (L) mmol/L      Calcium 8.9 mg/dL      Total Protein 7.8 g/dL      Albumin 4.60 g/dL      ALT (SGPT) 55 (H) U/L      AST (SGOT) 90 (H) U/L      Alkaline Phosphatase 80 U/L      Comment: Note New Reference Ranges        Total Bilirubin 0.5 mg/dL      eGFR Non African Amer 46 (L) mL/min/1.73      Globulin 3.2 gm/dL      A/G Ratio 1.4 (L) g/dL      BUN/Creatinine Ratio 21.0     Anion Gap 9.5 mmol/L     Osmolality, Calculated [[id number]]  (Abnormal) Collected:  06/10/18 1413    Specimen:  Blood from Arm, Left Updated:  06/10/18 1451     Osmolality Calc 260.4 (L) mOsm/kg     Lipase [[id number]]  (Normal) Collected:  06/10/18 1413    Specimen:  Blood from Arm, Left Updated:  06/10/18 1450     Lipase 49 U/L     C-reactive Protein [[id number]]  (Abnormal) Collected:  06/10/18 1413    Specimen:  Blood from Arm, Left Updated:  06/10/18 1450     C-Reactive Protein 9.19 (H) mg/dL     Protime-INR [[id number]]  (Normal) Collected:  06/10/18 1413    Specimen:  Blood from Arm, Left Updated:  06/10/18 1445     Protime 14.4 Seconds      Comment: Note new Reference Range        INR 1.10    Narrative:       Suggested INR therapeutic range for stable oral anticoagulant therapy:    Low Intensity  therapy:   1.5-2.0  Moderate Intensity therapy:   2.0-3.0  High Intensity therapy:   2.5-4.0    aPTT [[id number]]  (Normal) Collected:  06/10/18 1413    Specimen:  Blood from Arm, Left Updated:  06/10/18 1445     PTT 28.3 seconds      Comment: Note new Reference Range       Narrative:       PTT Heparin Therapeutic Range:  59 - 95 seconds    Lactic Acid, Plasma [[id number]]  (Normal) Collected:  06/10/18 1408    Specimen:  Blood from Arm, Right Updated:  06/10/18 1444     Lactate 1.0 mmol/L     CBC & Differential [[id number]] Collected:  06/10/18 1413    Specimen:  Blood Updated:  06/10/18 1438    Narrative:       The following orders were created for panel order CBC & Differential.  Procedure                               Abnormality         Status                     ---------                               -----------         ------                     CBC Auto Differential[[id number]]        Abnormal            Final result                 Please view results for these tests on the individual orders.    CBC Auto Differential [[id number]]  (Abnormal) Collected:  06/10/18 1413    Specimen:  Blood from Arm, Left Updated:  06/10/18 1438     WBC 1.69 (C) 10*3/mm3      RBC 4.37 10*6/mm3      Hemoglobin 11.1 (L) g/dL      Hematocrit 35.0 (L) %      MCV 80.1 fL      MCH 25.4 (L) pg      MCHC 31.7 (L) g/dL      RDW 16.5 (H) %      RDW-SD 46.8 fl      MPV 10.4 (H) fL      Platelets 75 (L) 10*3/mm3      Neutrophil % 73.3 (H) %      Lymphocyte % 15.4 (L) %      Monocyte % 9.5 %      Eosinophil % 0.0 %      Basophil % 1.8 %      Immature Grans % 0.0 %      Neutrophils, Absolute 1.24 (L) 10*3/mm3      Lymphocytes, Absolute 0.26 (L) 10*3/mm3      Monocytes, Absolute 0.16 10*3/mm3      Eosinophils, Absolute 0.00 10*3/mm3      Basophils, Absolute 0.03 10*3/mm3      Immature Grans, Absolute 0.00 10*3/mm3     Blood Culture - Blood, [[id number]] Collected:  06/10/18 1408    Specimen:  Blood from Arm, Right Updated:  06/10/18 1430    Blood  Culture - Blood, [[id number]] Collected:  06/10/18 1413    Specimen:  Blood from Arm, Left Updated:  06/10/18 1430    Blood Gas, Arterial [[id number]]  (Abnormal) Collected:  06/10/18 1414    Specimen:  Arterial Blood Updated:  06/10/18 1420     Site Arterial: left brachial     Vincent's Test N/A     pH, Arterial 7.449 pH units      pCO2, Arterial 27.3 (C) mm Hg      pO2, Arterial 72.5 (L) mm Hg      HCO3, Arterial 18.5 (C) mmol/L      Base Excess, Arterial -4.3 mmol/L      O2 Saturation, Arterial 94.2 %      Hemoglobin, Blood Gas 11.3 (L) g/dL      Hematocrit, Blood Gas 33.0 (L) %      Oxyhemoglobin 92.4 %      Methemoglobin 0.20 %      Carboxyhemoglobin 1.7 %      A-a Gradiant 37.4 mmHg      Temperature 98.6 C      Barometric Pressure for Blood Gas 726 mmHg      Modality Room Air     FIO2 21 %         Imaging Results (last 24 hours)     Procedure Component Value Units Date/Time    XR Chest 1 View [[id number]] Collected:  06/10/18 2132     Updated:  06/10/18 2134    Narrative:       XR CHEST 1 VW-     CLINICAL INDICATION: soa          COMPARISON: None available      TECHNIQUE: Single frontal view of the chest.     FINDINGS:     There is no focal alveolar infiltrate or effusion.  The cardiac silhouette is normal. The pulmonary vasculature is  unremarkable.  There is no evidence of an acute osseous abnormality.   There are no suspicious-appearing parenchymal soft tissue nodules.            Impression:       No evidence of active or acute cardiopulmonary disease on today's chest  radiograph.         This report was finalized on 6/10/2018 9:32 PM by Dr. Vlad Squires MD.       CT Abdomen Pelvis With Contrast [[id number]] Collected:  06/10/18 2131     Updated:  06/10/18 2134    Narrative:       CT ABDOMEN PELVIS W CONTRAST-     CLINICAL INDICATION: uti          COMPARISON: None available     TECHNIQUE: Axial images were acquired from the lung bases through the  pubic symphysis after IV , but without oral contrast.  Reformatted  images were created in both the coronal and sagittal planes.     Radiation dose reduction techniques were utilized per ALARA protocol.  Automated exposure control was initiated through either or CareDose or  DoseRight software packages by  protocol.           FINDINGS:   The lung bases are clear. There are no pleural effusions.     The liver is homogeneous. There is no evidence of focal hepatic mass     The spleen is homogeneous     There is no peripancreatic stranding or pancreatic head mass.     Small left adrenal nodule is present measuring 6.7 mm.     The kidneys show no evidence of hydronephrosis or hydroureter. I do not  see any distal ureteral stones.      Otherwise I do not see any free fluid or walled off fluid collections.     There is no bowel wall thickening or mesenteric stranding.     Postoperative changes seen in the lumbar spine. There is anterolisthesis  of L5 with respect to S1.       Impression:       : Small left adrenal nodule  The urinary bladder wall does not appear to be thickened on today's  study.                   This report was finalized on 6/10/2018 9:32 PM by Dr. Vlad Squires MD.             Assessment/Plan     Pancytopenia with recent tick bite  Urinary tract infection having taken Macrodantin for 3 days  Chronic lower back pain with history of lumbar spine surgery with partial paraplegia  Type 2 diabetes mellitus  Hypertension    She'll be admitted to observation.  I suspect that her pancytopenia most notably with neutropenia and thrombocytopenia is likely related to a tick borne illness.  I will obtain a peripheral smear.  I spoke with the emergency room physician and asked that tick borne illness panel be sent specifically for Lyme disease and ehrlichiosis. This panel includes antibodies.  It's possible that these could be negative since we are in the acute phase of illness.  Start Rocephin for treatment of UTI.  Start IV doxycycline for treatment of tick borne  illness.    Accu-Cheks before meals and at bedtime.  Moderate dose sliding scale insulin.  Continue home regimen    Continue metoprolol    Continue chronic pain management    Lovenox for DVT prophylaxis        Samuel Duane Kreis, MD  06/10/18  11:07 PM        Electronically signed by Samuel Duane Kreis, MD at 6/10/2018 11:13 PM          Emergency Department Notes      Luanne Kirkland RN at 6/10/2018  1:55 PM        Family and pt reports pt has been having some spasms that started on Monday, seen at [facility] on Friday, x3 days with vomiting, diarrhea, not eating     Luanne Kirkland RN  06/10/18 1356      Electronically signed by Luanne Kirkland RN at 6/10/2018  1:56 PM     Vlad Cabello at 6/10/2018  2:08 PM        Records requested from [facility]     Vlad Cabello  06/10/18 1408      Electronically signed by Vlad Cabello at 6/10/2018  2:08 PM     Katerina Robledo at 6/10/2018  3:21 PM        Assisted pt restroom via wheelchair to obtain urine and stool samples. Urine specimen obtained. Pt was unable to provide stool sample at this time. Nurse aware.     Katerina Robledo  06/10/18 1522      Electronically signed by Katerina Robledo at 6/10/2018  3:22 PM     Luanne Kirkland RN at 6/10/2018  4:23 PM        Pt in ct at this time     Luanne Kirkland RN  06/10/18 1623      Electronically signed by Luanne Kirkland RN at 6/10/2018  4:23 PM     Luanne Kirkland RN at 6/10/2018  5:25 PM        Pt assisted by er tech to bathroom and back to room, tolerated well     Luanne Kirkland RN  06/10/18 1726      Electronically signed by Luanne Kirkland RN at 6/10/2018  5:26 PM     Debbie Gilliam DO at 6/10/2018  5:47 PM          Subjective   62-year-old white female with past history of hypertension hyperlipidemia diabetes presents emergency department complaining of weakness diarrhea and not feeling well patient says she went to the emergency department at [facility]  on 6/8/18 I was diagnosed with a urinary tract infection and was put on Macrobid home said developed diarrhea and just is not feeling any better or said she went home, and the reason they did originally went to the emergency department because she's been having muscle cramps and just wasn't feeling well and she still not feeling any better and came in to be evaluated        History provided by:  Patient  Illness   Location:  Diarrhea, fatigue, recent diagnosis of UTI  Severity:  Moderate  Onset quality:  Gradual  Timing:  Constant  Progression:  Worsening  Chronicity:  New  Context:  Diagnoses UTI on 68 and is now developed fatigue diarrhea and just not feeling any better  Associated symptoms: fatigue and myalgias        Review of Systems   Constitutional: Positive for fatigue.   Musculoskeletal: Positive for myalgias.       Past Medical History:   Diagnosis Date   • Arthritis    • Depression    • Diabetes mellitus    • Hyperlipidemia    • Hypertension    • Incontinence     B&B   • Infectious viral hepatitis    • Kidney stone        No Known Allergies    Past Surgical History:   Procedure Laterality Date   • APPENDECTOMY     • CHOLECYSTECTOMY     • EYE SURGERY     • HEMORRHOIDECTOMY     • SPINE SURGERY      lumbar   • TEMPOROMANDIBULAR JOINT SURGERY     • TUBAL ABDOMINAL LIGATION         Family History   Problem Relation Age of Onset   • Diabetes Mother    • Heart disease Father        Social History     Social History   • Marital status:      Social History Main Topics   • Smoking status: Former Smoker   • Alcohol use No   • Drug use: No     Other Topics Concern   • Not on file           Objective   Physical Exam   Constitutional: She is oriented to person, place, and time. She appears well-developed and well-nourished.   HENT:   Head: Normocephalic and atraumatic.   Right Ear: External ear normal.   Left Ear: External ear normal.   Nose: Nose normal.   Mouth/Throat: Oropharynx is clear and moist.   Eyes:  EOM are normal. Pupils are equal, round, and reactive to light.   Neck: Neck supple.   Cardiovascular: Normal rate and regular rhythm.    Pulmonary/Chest: Effort normal and breath sounds normal.   Abdominal: Soft. Bowel sounds are normal.   Musculoskeletal: Normal range of motion.   Neurological: She is alert and oriented to person, place, and time.   Skin: Skin is warm. Capillary refill takes less than 2 seconds.   Psychiatric: She has a normal mood and affect. Her behavior is normal. Judgment and thought content normal.   Nursing note and vitals reviewed.      Procedures          ED Course  ED Course as of Kush 10 1747   Sun Kush 10, 2018   1728 Patient seen at [facility] on 6/8/18 and diagnosed with a urinary tract infection  [MH]      ED Course User Index  [MH] Debbie Gilliam DO                  MDM  Number of Diagnoses or Management Options  Cystitis: new and requires workup  Dehydration: new and requires workup  Hepatosplenomegaly: new and requires workup  Pancytopenia: new and requires workup     Amount and/or Complexity of Data Reviewed  Clinical lab tests: ordered and reviewed  Tests in the radiology section of CPT®:  reviewed and ordered  Tests in the medicine section of CPT®:  reviewed and ordered  Discuss the patient with other providers: yes  Independent visualization of images, tracings, or specimens: yes          Final diagnoses:   Pancytopenia   Cystitis   Dehydration            Debbie Gilliam DO  06/11/18 1648      Electronically signed by Debbie Gilliam DO at 6/11/2018  4:48 PM     Judit Cline at 6/10/2018  6:03 PM        Gave pt a cup of ice. Per Dr. Tawana Cline  06/10/18 1803      Electronically signed by Judit Cline at 6/10/2018  6:03 PM     Marianna Ivy RN at 6/10/2018  7:24 PM        Pt being transported to room [facility] at this via wheelchair per ED tech. Pt has patent IV to left AC with IV fluids and abx infusing as ordered.  SYDNEE. Report called to Jeny Alcantara RN     Marianna Ivy RN  06/10/18 1925      Electronically signed by Marianna Ivy RN at 6/10/2018  7:25 PM       Hospital Medications (all)       Dose Frequency Start End    acetaminophen (TYLENOL) tablet 650 mg 650 mg Every 6 Hours PRN 6/10/2018     Sig - Route: Take 2 tablets by mouth Every 6 (Six) Hours As Needed for Mild Pain . - Oral    amitriptyline (ELAVIL) tablet 25 mg 25 mg Nightly 6/10/2018     Sig - Route: Take 1 tablet by mouth Every Night. - Oral    baclofen (LIORESAL) tablet 10 mg 10 mg 3 Times Daily 6/10/2018     Sig - Route: Take 1 tablet by mouth 3 (Three) Times a Day. - Oral    cefTRIAXone (ROCEPHIN) 1 g/100 mL 0.9% NS (MBP) 1 g Once 6/10/2018 6/10/2018    Sig - Route: Infuse 100 mL into a venous catheter 1 (One) Time. - Intravenous    cefTRIAXone (ROCEPHIN) 1 g/100 mL 0.9% NS (MBP) 1 g Every 24 Hours 6/11/2018 6/18/2018    Sig - Route: Infuse 100 mL into a venous catheter Daily. - Intravenous    dextrose (D50W) solution 25 g 25 g Every 15 Minutes PRN 6/10/2018     Sig - Route: Infuse 50 mL into a venous catheter Every 15 (Fifteen) Minutes As Needed for Low Blood Sugar (Blood Sugar Less Than 70). - Intravenous    dextrose (GLUTOSE) oral gel 15 g 15 g Every 15 Minutes PRN 6/10/2018     Sig - Route: Take 15 g by mouth Every 15 (Fifteen) Minutes As Needed for Low Blood Sugar (Blood sugar less than 70). - Oral    dextrose 5 % and sodium chloride 0.45 % infusion 100 mL/hr Continuous 6/10/2018     Sig - Route: Infuse 100 mL/hr into a venous catheter Continuous. - Intravenous    doxycycline (VIBRAMYCIN) 100 mg/100 mL 0.9% NS  mg Once 6/10/2018 6/10/2018    Sig - Route: Infuse 100 mL into a venous catheter 1 (One) Time. - Intravenous    doxycycline (VIBRAMYCIN) 100 mg/100 mL 0.9% NS  mg Every 12 Hours 6/11/2018 6/18/2018    Sig - Route: Infuse 100 mL into a venous catheter Every 12 (Twelve) Hours. - Intravenous    gabapentin (NEURONTIN)  capsule 800 mg 800 mg Every 8 Hours Scheduled 6/10/2018     Sig - Route: Take 2 capsules by mouth Every 8 (Eight) Hours. - Oral    glipiZIDE (GLUCOTROL) tablet 10 mg 10 mg 2 Times Daily Before Meals 6/11/2018     Sig - Route: Take 2 tablets by mouth 2 (Two) Times a Day Before Meals. - Oral    glucagon (human recombinant) (GLUCAGEN DIAGNOSTIC) injection 1 mg 1 mg As Needed 6/10/2018     Sig - Route: Inject 1 mg under the skin As Needed (Blood Glucose Less Than 70). - Subcutaneous    insulin aspart (novoLOG) injection 0-9 Units 0-9 Units 4 Times Daily Before Meals & Nightly 6/10/2018     Sig - Route: Inject 0-9 Units under the skin 4 (Four) Times a Day Before Meals & at Bedtime. - Subcutaneous    insulin detemir (LEVEMIR) injection 58 Units 58 Units Daily 6/11/2018     Sig - Route: Inject 58 Units under the skin Daily. - Subcutaneous    iopamidol (ISOVUE-300) 61 % injection 100 mL 100 mL Once in Imaging 6/10/2018 6/10/2018    Sig - Route: Infuse 100 mL into a venous catheter Once. - Intravenous    metFORMIN (GLUCOPHAGE) tablet 1,000 mg 1,000 mg 2 Times Daily With Meals 6/10/2018     Sig - Route: Take 2 tablets by mouth 2 (Two) Times a Day With Meals. - Oral    metoprolol succinate XL (TOPROL-XL) 24 hr tablet 25 mg 25 mg Daily 6/11/2018     Sig - Route: Take 1 tablet by mouth Daily. - Oral    nitroglycerin (NITROSTAT) SL tablet 0.4 mg 0.4 mg Every 5 Minutes PRN 6/10/2018     Sig - Route: Place 1 tablet under the tongue Every 5 (Five) Minutes As Needed for Chest Pain (if systolic BP greater than 100 mm/Hg.). - Sublingual    ondansetron (ZOFRAN) injection 4 mg 4 mg Every 6 Hours PRN 6/10/2018     Sig - Route: Infuse 2 mL into a venous catheter Every 6 (Six) Hours As Needed for Nausea or Vomiting. - Intravenous    oxyCODONE-acetaminophen (PERCOCET) 7.5-325 MG per tablet 1 tablet 1 tablet Every 6 Hours PRN 6/10/2018 6/20/2018    Sig - Route: Take 1 tablet by mouth Every 6 (Six) Hours As Needed for Severe Pain . - Oral     "pantoprazole (PROTONIX) EC tablet 40 mg 40 mg Daily 6/11/2018     Sig - Route: Take 1 tablet by mouth Daily. - Oral    rosuvastatin (CRESTOR) tablet 20 mg 20 mg Nightly 6/10/2018     Sig - Route: Take 1 tablet by mouth Every Night. - Oral    sodium chloride 0.9 % flush 10 mL 10 mL As Needed 6/10/2018     Sig - Route: Infuse 10 mL into a venous catheter As Needed for Line Care. - Intravenous    Linked Group 1:  \"And\" Linked Group Details        venlafaxine (EFFEXOR) tablet 75 mg 75 mg 2 Times Daily 6/10/2018     Sig - Route: Take 2 tablets by mouth 2 (Two) Times a Day. - Oral    enoxaparin (LOVENOX) syringe 40 mg (Discontinued) 40 mg Every 24 Hours 6/10/2018 6/11/2018    Sig - Route: Inject 0.4 mL under the skin Daily. - Subcutaneous    sodium chloride 0.9 % infusion (Discontinued) 125 mL/hr Continuous 6/10/2018 6/10/2018    Sig - Route: Infuse 125 mL/hr into a venous catheter Continuous. - Intravenous    sodium chloride 0.9 % infusion (Discontinued) 125 mL/hr Continuous 6/10/2018 6/10/2018    Sig - Route: Infuse 125 mL/hr into a venous catheter Continuous. - Intravenous             Physician Progress Notes (all)      Samuel Duane Kreis, MD at 6/11/2018  7:40 AM               LOS: 0 days     Chief Complaint:  Pancytopenia/UTI/Possible tick borne illness    Subjective     Interval History:   She reports that she is feeling some better this morning after having splet well overnight. She was noted febrile overnight with temp of 101.1. She remains on Rocephin + doxycycline. No further diarrhea or muscle spasms. WBC's 1.72, Hgb 10.5, platelets have dropped to 55,000. No further sweats or chills. She denies any acute complaints this AM. She is a poor historian and somewhat somnolent this AM.       Objective     Vital Signs  /64 (BP Location: Right arm, Patient Position: Lying)   Pulse 77   Temp 97.7 °F (36.5 °C) (Oral)   Resp 18   Ht 162.6 cm (64\")   Wt 81.6 kg (180 lb)   SpO2 95%   BMI 30.90 kg/m²    Intake & " Output (last day)       06/10 0701 - 06/11 0700 06/11 0701 - 06/12 0700    I.V. (mL/kg) 1000 (12.3)     IV Piggyback 300     Total Intake(mL/kg) 1300 (15.9)     Urine (mL/kg/hr) 600     Stool 0     Total Output 600      Net +700            Unmeasured Stool Occurrence 2 x             Physical Exam:     General Appearance:    Solmnolent yet cooperative, in no acute distress   Head:    Normocephalic, without obvious abnormality, atraumatic   Eyes:            Lids and lashes normal, conjunctivae and sclerae normal, no   icterus, no pallor, corneas clear, PERRLA   Ears:    Ears appear intact with no abnormalities noted   Throat:   No oral lesions, no thrush, oral mucosa moist   Neck:   No adenopathy, supple, trachea midline, no thyromegaly, no   carotid bruit, no JVD   Lungs:     Clear to auscultation,respirations regular, even and                  unlabored    Heart:    Regular rhythm and normal rate, normal S1 and S2, no            murmur, no gallop, no rub, no click   Chest Wall:    No abnormalities observed   Abdomen:     Normal bowel sounds, no masses, no organomegaly, soft        non-tender, non-distended, no guarding, no rebound                tenderness   Extremities:   Moves all extremities well, no edema, no cyanosis, no             redness   Pulses:   Pulses palpable and equal bilaterally   Skin:   No bleeding, bruising or rash   Lymph nodes:   No palpable adenopathy   Neurologic:   Cranial nerves 2 - 12 grossly intact, sensation intact, DTR       present and equal bilaterally        Results Review:    Lab Results   Component Value Date    WBC 1.72 (C) 06/11/2018    HGB 10.5 (L) 06/11/2018    HCT 32.8 (L) 06/11/2018    MCV 80.2 06/11/2018    PLT 55 (L) 06/11/2018       Lab Results   Component Value Date    GLUCOSE 98 06/11/2018    BUN 23 (H) 06/11/2018    CREATININE 1.07 06/11/2018    EGFRIFNONA 52 (L) 06/11/2018    BCR 21.5 06/11/2018     (L) 06/11/2018    K 3.5 06/11/2018     06/11/2018    CO2  19.8 (L) 06/11/2018    CALCIUM 8.0 06/11/2018    ALBUMIN 3.90 06/11/2018    LABIL2 1.5 06/11/2018    AST 83 (H) 06/11/2018    ALT 47 (H) 06/11/2018     Lab Results   Component Value Date    INR 1.10 06/10/2018       Glucose   Date Value Ref Range Status   06/11/2018 86 70 - 130 mg/dL Final   06/10/2018 80 70 - 130 mg/dL Final          Medication Review:     Current Facility-Administered Medications:   •  acetaminophen (TYLENOL) tablet 650 mg, 650 mg, Oral, Q6H PRN, Samuel Duane Kreis, MD, 650 mg at 06/11/18 0337  •  amitriptyline (ELAVIL) tablet 25 mg, 25 mg, Oral, Nightly, Samuel Duane Kreis, MD, 25 mg at 06/10/18 2217  •  baclofen (LIORESAL) tablet 10 mg, 10 mg, Oral, TID, Samuel Duane Kreis, MD, 10 mg at 06/10/18 2217  •  cefTRIAXone (ROCEPHIN) 1 g/100 mL 0.9% NS (MBP), 1 g, Intravenous, Q24H, Samuel Duane Kreis, MD  •  dextrose (D50W) solution 25 g, 25 g, Intravenous, Q15 Min PRN, Samuel Duane Kreis, MD  •  dextrose (GLUTOSE) oral gel 15 g, 15 g, Oral, Q15 Min PRN, Samuel Duane Kreis, MD  •  dextrose 5 % and sodium chloride 0.45 % infusion, 100 mL/hr, Intravenous, Continuous, Samuel Duane Kreis, MD, Last Rate: 100 mL/hr at 06/11/18 0516, 100 mL/hr at 06/11/18 0516  •  doxycycline (VIBRAMYCIN) 100 mg/100 mL 0.9% NS MBP, 100 mg, Intravenous, Q12H, Samuel Duane Kreis, MD, 100 mg at 06/11/18 0513  •  enoxaparin (LOVENOX) syringe 40 mg, 40 mg, Subcutaneous, Q24H, Samuel Duane Kreis, MD, 40 mg at 06/10/18 2221  •  gabapentin (NEURONTIN) capsule 800 mg, 800 mg, Oral, Q8H, Samuel Duane Kreis, MD, 800 mg at 06/11/18 0512  •  glipiZIDE (GLUCOTROL) tablet 10 mg, 10 mg, Oral, BID AC, Samuel Duane Kreis, MD  •  glucagon (human recombinant) (GLUCAGEN DIAGNOSTIC) injection 1 mg, 1 mg, Subcutaneous, PRN, Samuel Duane Kreis, MD  •  insulin aspart (novoLOG) injection 0-9 Units, 0-9 Units, Subcutaneous, 4x Daily AC & at Bedtime, Samuel Duane Kreis, MD  •  insulin detemir (LEVEMIR) injection 58 Units, 58 Units, Subcutaneous, Daily,  Samuel Duane Kreis, MD  •  metFORMIN (GLUCOPHAGE) tablet 1,000 mg, 1,000 mg, Oral, BID With Meals, Samuel Duane Kreis, MD, 1,000 mg at 06/10/18 2219  •  metoprolol succinate XL (TOPROL-XL) 24 hr tablet 25 mg, 25 mg, Oral, Daily, Samuel Duane Kreis, MD  •  nitroglycerin (NITROSTAT) SL tablet 0.4 mg, 0.4 mg, Sublingual, Q5 Min PRN, Samuel Duane Kreis, MD  •  ondansetron (ZOFRAN) injection 4 mg, 4 mg, Intravenous, Q6H PRN, Samuel Duane Kreis, MD, 4 mg at 06/10/18 2033  •  oxyCODONE-acetaminophen (PERCOCET) 7.5-325 MG per tablet 1 tablet, 1 tablet, Oral, Q6H PRN, Samuel Duane Kreis, MD, 1 tablet at 06/11/18 0512  •  pantoprazole (PROTONIX) EC tablet 40 mg, 40 mg, Oral, Daily, Samuel Duane Kreis, MD  •  rosuvastatin (CRESTOR) tablet 20 mg, 20 mg, Oral, Nightly, Samuel Duane Kreis, MD, 20 mg at 06/10/18 2217  •  Insert peripheral IV, , , Once **AND** sodium chloride 0.9 % flush 10 mL, 10 mL, Intravenous, PRN, Debbie Gilliam,   •  venlafaxine (EFFEXOR) tablet 75 mg, 75 mg, Oral, BID, Samuel Duane Kreis, MD, 75 mg at 06/10/18 2216      Assessment/Plan   Pancytopenia  Possible tick borne illness  UTI  DMII  HTN  Chronic low back pain  Peripheral neuropathy  Fever    Continue medical monitoring    Awaiting tick borne illness panel    Continue with rocephin + Doxycycline    Continue with metformin + glipizide + levemir + S/S insulin for blood sugar regulation    Percocet 7.5 mg q 6 hours prn pain    Monitor menatal status and adjust medications accordingly    Consider ID and/or hem/onc consultation if not improvng    D/C lovenox secondary to drop in PLTs overnight, start Teds and SCDs    CBC, BMP daily    GARIMA Conteh  06/11/18  7:40 AM     She is seen this morning by me and I agree with the above note.  She continues to have a poor appetite and is hurting severely in her legs.  She continues to have some memory related issues.  She had a fever last night.  She denies any abdominal discomfort.  She is having some  diarrhea.  Platelet count has dropped a little further to 55,000.  I continue to feel that this is likely a tick borne illness.  Peripheral smear is pending.  Continue Rocephin for UTI and doxycycline for probable tick borne illness    Samuel Duane Kreis, MD  06/11/18  4:10 PM        Electronically signed by Samuel Duane Kreis, MD at 6/11/2018  4:12 PM       Consult Notes (all)     No notes of this type exist for this encounter.

## 2018-06-12 NOTE — PLAN OF CARE
Problem: Fall Risk (Adult)  Goal: Identify Related Risk Factors and Signs and Symptoms  Outcome: Ongoing (interventions implemented as appropriate)    Goal: Absence of Fall  Outcome: Ongoing (interventions implemented as appropriate)      Problem: Pain, Acute (Adult)  Goal: Identify Related Risk Factors and Signs and Symptoms  Outcome: Ongoing (interventions implemented as appropriate)    Goal: Acceptable Pain Control/Comfort Level  Outcome: Ongoing (interventions implemented as appropriate)      Problem: Fluid Volume Deficit (Adult)  Goal: Identify Related Risk Factors and Signs and Symptoms  Outcome: Ongoing (interventions implemented as appropriate)    Goal: Optimal Fluid Balance  Outcome: Ongoing (interventions implemented as appropriate)      Problem: Patient Care Overview  Goal: Plan of Care Review  Outcome: Ongoing (interventions implemented as appropriate)    Goal: Individualization and Mutuality  Outcome: Ongoing (interventions implemented as appropriate)

## 2018-06-12 NOTE — DISCHARGE SUMMARY
Date of Discharge:  6/12/2018    Discharge Diagnosis:    Sepsis likely due to tick borne illness  Possible tick borne illness causing pancytopenia  Urinary tract infection  DMII  HTN  Chronic low back pain  Peripheral neuropathy  Fever    Hospital Course   Patient is a 62 y.o. female presented to the emergency room for the second time in 2 days related to muscle spasms of the legs, nausea, diarrhea, fatigability, fevers chills and bodyaches had been progressive over the preceding few days.  Upon presentation was initially diagnosed with a urinary tract infection and sent home however symptoms worsened within 24 hours.  Upon return was noted to have a significant pancytopenia and noted to be febrile with diaphoresis.  Patient was admitted to the hospital floor and aggressively hydrated.  Muscle spasms overall have improved at this point.  She does endorse history of multiple tick bites over the preceding few weeks and it was felt that she likely had a tickborne illness.  She has been started on Rocephin and doxycycline during hospital stay.  Tickborne illness panel is currently pending.  White blood cell count this morning has improved to 2.41, hemoglobin is stable, platelets stable at 56,000.  Potassium noted mildly low at 3.4.  She's had no further fevers over the last 24 hours.  She states that overall is feeling markedly better.  She has had no further progression of spasms in her legs and overall feels that she has reached baseline.  As been maximized from a hospitalization standpoint and recommended for discharge home.  She will continue doxycycline 100 mg twice a day for the next 3 weeks.  We will have close Follow-up on outpatient basis with repeat labs later this week to ensure her blood counts continue to remain stable/improving.  She will follow-up with in our clinic within the next 2 weeks for ongoing evaluation of recovery effort.  We will continue to follow-up with her tickborne illness panel on an  outpatient basis.    Procedures Performed  Imaging Results (last 72 hours)     Procedure Component Value Units Date/Time    XR Chest 1 View [[id number]] Collected:  06/10/18 2132     Updated:  06/10/18 2134    Narrative:       XR CHEST 1 VW-     CLINICAL INDICATION: soa          COMPARISON: None available      TECHNIQUE: Single frontal view of the chest.     FINDINGS:     There is no focal alveolar infiltrate or effusion.  The cardiac silhouette is normal. The pulmonary vasculature is  unremarkable.  There is no evidence of an acute osseous abnormality.   There are no suspicious-appearing parenchymal soft tissue nodules.            Impression:       No evidence of active or acute cardiopulmonary disease on today's chest  radiograph.         This report was finalized on 6/10/2018 9:32 PM by Dr. Vlad Squires MD.       CT Abdomen Pelvis With Contrast [[id number]] Collected:  06/10/18 2131     Updated:  06/10/18 2134    Narrative:       CT ABDOMEN PELVIS W CONTRAST-     CLINICAL INDICATION: uti          COMPARISON: None available     TECHNIQUE: Axial images were acquired from the lung bases through the  pubic symphysis after IV , but without oral contrast.  Reformatted images were created in both the coronal and sagittal planes.     Radiation dose reduction techniques were utilized per ALARA protocol.  Automated exposure control was initiated through either or CareDose or  DoseRigSoftRun software packages by  protocol.           FINDINGS:   The lung bases are clear. There are no pleural effusions.     The liver is homogeneous. There is no evidence of focal hepatic mass     The spleen is homogeneous     There is no peripancreatic stranding or pancreatic head mass.     Small left adrenal nodule is present measuring 6.7 mm.     The kidneys show no evidence of hydronephrosis or hydroureter. I do not  see any distal ureteral stones.      Otherwise I do not see any free fluid or walled off fluid collections.     There  is no bowel wall thickening or mesenteric stranding.     Postoperative changes seen in the lumbar spine. There is anterolisthesis  of L5 with respect to S1.       Impression:       : Small left adrenal nodule  The urinary bladder wall does not appear to be thickened on today's  study.                   This report was finalized on 6/10/2018 9:32 PM by Dr. Vlad Squires MD.                  Consults:   Consults     Date and Time Order Name Status Description    6/10/2018 2369 Family Practice (on-call MD unless specified)            Pertinent Test Results:   Lab Results (last 24 hours)     Procedure Component Value Units Date/Time    Stool Culture - Stool, Per Rectum [[id number]] Collected:  06/10/18 2151    Specimen:  Stool from Per Rectum Updated:  06/12/18 0930     Stool Culture Normal Fecal Twila    POC Glucose Once [[id number]]  (Abnormal) Collected:  06/12/18 0650    Specimen:  Blood Updated:  06/12/18 0657     Glucose 60 (L) mg/dL     Narrative:       Meter: [id number] : [id number] nina perea    CBC & Differential [[id number]] Collected:  06/12/18 0045    Specimen:  Blood Updated:  06/12/18 0314    Narrative:       The following orders were created for panel order CBC & Differential.  Procedure                               Abnormality         Status                     ---------                               -----------         ------                     Manual Differential[[id number]]          Abnormal            Final result               Scan Slide[[id number]]                                       Final result               CBC Auto Differential[[id number]]        Abnormal            Final result                 Please view results for these tests on the individual orders.    Manual Differential [[id number]]  (Abnormal) Collected:  06/12/18 0045    Specimen:  Blood Updated:  06/12/18 0314     Neutrophil % 34.0 %      Lymphocyte % 54.0 (H) %      Monocyte % 8.0 %      Basophil % 2.0 %      Bands %  2.0 (L) %       Neutrophils Absolute 0.87 (L) 10*3/mm3      Lymphocytes Absolute 1.30 10*3/mm3      Monocytes Absolute 0.19 10*3/mm3      Basophils Absolute 0.05 10*3/mm3      RBC Morphology Normal     Platelet Morphology Normal    CBC Auto Differential [[id number]]  (Abnormal) Collected:  06/12/18 0045    Specimen:  Blood Updated:  06/12/18 0314     WBC 2.41 (C) 10*3/mm3      RBC 4.15 (L) 10*6/mm3      Hemoglobin 10.4 (L) g/dL      Hematocrit 34.0 (L) %      MCV 81.9 fL      MCH 25.1 (L) pg      MCHC 30.6 (L) g/dL      RDW 16.9 (H) %      RDW-SD 50.5 fl      MPV 11.4 (H) fL      Platelets 56 (L) 10*3/mm3     Scan Slide [[id number]] Collected:  06/12/18 0045    Specimen:  Blood Updated:  06/12/18 0314     Scan Slide --     Comment: See Manual Differential Results       Basic Metabolic Panel [[id number]]  (Abnormal) Collected:  06/12/18 0045    Specimen:  Blood Updated:  06/12/18 0259     Glucose 141 (H) mg/dL      BUN 18 mg/dL      Creatinine 0.99 mg/dL      Sodium 134 (L) mmol/L      Potassium 3.4 (L) mmol/L      Chloride 107 mmol/L      CO2 19.0 (L) mmol/L      Calcium 8.4 mg/dL      eGFR Non African Amer 57 (L) mL/min/1.73      BUN/Creatinine Ratio 18.2     Anion Gap 8.0 mmol/L     Narrative:       GFR Normal >60  Chronic Kidney Disease <60  Kidney Failure <15    Osmolality, Calculated [[id number]]  (Abnormal) Collected:  06/12/18 0045    Specimen:  Blood Updated:  06/12/18 0259     Osmolality Calc 272.5 (L) mOsm/kg     POC Glucose Once [[id number]]  (Abnormal) Collected:  06/11/18 2300    Specimen:  Blood Updated:  06/11/18 2306     Glucose 169 (H) mg/dL     Narrative:       Meter: [id number] : [id number] rick lin    POC Glucose Once [[id number]]  (Abnormal) Collected:  06/11/18 2221    Specimen:  Blood Updated:  06/11/18 2226     Glucose 55 (L) mg/dL     Narrative:       Meter: [id number] : [id number] Tracee Gtz    POC Glucose Once [[id number]]  (Abnormal) Collected:  06/11/18 2200    Specimen:  Blood Updated:   06/11/18 2207     Glucose 48 (C) mg/dL     Narrative:       Result Not Confirmed Meter: [id number] : [id number] stephen soni    POC Glucose Once [[id number]]  (Normal) Collected:  06/11/18 2010    Specimen:  Blood Updated:  06/11/18 2017     Glucose 89 mg/dL     Narrative:       Meter: [id number] : [id number] rick lin    POC Glucose Once [[id number]]  (Abnormal) Collected:  06/11/18 1638    Specimen:  Blood Updated:  06/11/18 1650     Glucose 60 (L) mg/dL     Narrative:       Meter: [id number] : [id number] SOLORZANO PAULO    Blood Culture - Blood, [[id number]]  (Normal) Collected:  06/10/18 1413    Specimen:  Blood from Arm, Left Updated:  06/11/18 1445     Blood Culture No growth at 24 hours    Blood Culture - Blood, [[id number]]  (Normal) Collected:  06/10/18 1408    Specimen:  Blood from Arm, Right Updated:  06/11/18 1445     Blood Culture No growth at 24 hours    POC Glucose Once [[id number]]  (Normal) Collected:  06/11/18 1037    Specimen:  Blood Updated:  06/11/18 1052     Glucose 123 mg/dL     Narrative:       Meter: [id number] : [id number] [employer]                                                  Condition on Discharge:  Stable    Physical Exam:     General Appearance:    Alert, cooperative, in no acute distress   Head:    Normocephalic, without obvious abnormality, atraumatic   Eyes:            Lids and lashes normal, conjunctivae and sclerae normal, no   icterus, no pallor, corneas clear, PERRLA   Ears:    Ears appear intact with no abnormalities noted   Throat:   No oral lesions, no thrush, oral mucosa moist   Neck:   No adenopathy, supple, trachea midline, no thyromegaly, no   carotid bruit, no JVD   Back:     No kyphosis present, no scoliosis present, no skin lesions,      erythema or scars, no tenderness to percussion or                   palpation,   range of motion normal   Lungs:     Clear to auscultation,respirations regular, even and                  unlabored    Heart:     Regular rhythm and normal rate, normal S1 and S2, no            murmur, no gallop, no rub, no click   Chest Wall:    No abnormalities observed   Abdomen:     Normal bowel sounds, no masses, no organomegaly, soft        non-tender, non-distended, no guarding, no rebound                tenderness   Rectal:     Deferred   Extremities:   Moves all extremities well, no edema, no cyanosis, no             redness   Pulses:   Pulses palpable and equal bilaterally   Skin:   No bleeding, bruising or rash   Lymph nodes:   No palpable adenopathy   Neurologic:   Cranial nerves 2 - 12 grossly intact, sensation intact, DTR       present and equal bilaterally       Discharge Disposition  Home or Self Care    Discharge Medications     Discharge Medications      New Medications      Instructions Start Date   doxycycline 100 MG capsule  Commonly known as:  MONODOX   100 mg, Oral, Every 12 Hours Scheduled         Continue These Medications      Instructions Start Date   amitriptyline 25 MG tablet  Commonly known as:  ELAVIL   25 mg, Oral, Nightly      baclofen 10 MG tablet  Commonly known as:  LIORESAL   10 mg, Oral, 3 Times Daily      gabapentin 800 MG tablet  Commonly known as:  NEURONTIN   800 mg, Oral, 3 Times Daily      glipiZIDE 10 MG tablet  Commonly known as:  GLUCOTROL   10 mg, Oral, 2 Times Daily Before Meals      JARDIANCE 25 MG tablet  Generic drug:  Empagliflozin   25 mg, Oral, Daily      metFORMIN 1000 MG tablet  Commonly known as:  GLUCOPHAGE   1,000 mg, Oral, 2 Times Daily With Meals      metoprolol succinate XL 25 MG 24 hr tablet  Commonly known as:  TOPROL-XL   25 mg, Oral, Daily      ondansetron 4 MG tablet  Commonly known as:  ZOFRAN   4 mg, Oral, Every 8 Hours PRN      oxyCODONE-acetaminophen 7.5-325 MG per tablet  Commonly known as:  PERCOCET   1 tablet, Oral, Every 6 Hours PRN      pantoprazole 40 MG EC tablet  Commonly known as:  PROTONIX   40 mg, Oral, Daily      rosuvastatin 20 MG tablet  Commonly known as:   CRESTOR   20 mg, Oral, Nightly      TOUJEO SOLOSTAR 300 UNIT/ML solution pen-injector  Generic drug:  Insulin Glargine   58 Units, Subcutaneous, Daily      venlafaxine 75 MG tablet  Commonly known as:  EFFEXOR   75 mg, Oral, 2 Times Daily             Discharge Diet:    Diet Orders (active)     Start     Ordered    06/10/18 1959  Diet Regular  Diet Effective Now      06/10/18 2000          Follow-up Appointments  Dr. Woodard within 1 week  Additional Instructions for the Follow-ups that You Need to Schedule     Discharge Follow-up with PCP    As directed      Follow Up Details:  Dr. Woodard in 1 week               Test Results Pending at Discharge   Order Current Status    Babesia Microti Antibody Panel In process    Ehrlichia Antibody Panel In process    Lyme, Total Antibody Test / Reflex In process    Peripheral Blood Smear In process    Rickettsia Typhi (Typhus Fever) Antibody In process    Bowen SF (IgG / M) In process    Tularemia Antibody In process    Blood Culture - Blood, Preliminary result    Blood Culture - Blood, Preliminary result    Stool Culture - Stool, Per Rectum Preliminary result         Total time spent on date of discharge has been 40 minutes  Samuel Duane Kreis, MD  06/12/18  9:31 AM

## 2018-06-13 LAB
BACTERIA SPEC AEROBE CULT: NORMAL
R RICKETTSI IGG SER QL IA: NEGATIVE
R RICKETTSI IGM TITR SER: 0.23 INDEX (ref 0–0.89)
SPECIMEN STATUS: NORMAL
TYPHUS FEVER GROUP IGG: NORMAL
TYPHUS FEVER GROUP IGM: NORMAL

## 2018-06-13 NOTE — PAYOR COMM NOTE
"                                             [facility]  NPI:[id number]    Utilization Review  Contact: Yamileth Sandra RN  Phone: [phone]  Fax:[phone]    DISCHARGE NOTIFICATION  Discharge to home on 6/12/2018    Erasmo Easley (62 y.o. Female)     Date of Birth Social Security Number Address Home Phone MRN    1955  [id number] [facility] [address] [phone] [id number]    Mandaeism Marital Status          None        Admission Date Admission Type Admitting Provider Attending Provider Department, Room/Bed    6/10/18 Emergency Kreis, Samuel Duane, MD  [facility] 3 [facility], 3316/2S    Discharge Date Discharge Disposition Discharge Destination        6/12/2018 Home or Self Care              Attending Provider:  (none)   Allergies:  Bee Venom    Isolation:  None   Infection:  None   Code Status:  Prior    Ht:  162.6 cm (64\")   Wt:  81.8 kg (180 lb 4.8 oz)    Admission Cmt:  None   Principal Problem:  None                Active Insurance as of 6/10/2018     Primary Coverage     Payor Plan Insurance Group Employer/Plan Group    HUMANA MEDICARE REPLACEMENT HUMANA MEDICARE REPL [id number]     Payor Plan Address Payor Plan Phone Number Effective From Effective To    [address] [address] [phone] 1/1/2018     [facility] [address]       Subscriber Name Subscriber Birth Date Member ID       ERASMO EASLEY 1955 [id number]                 Emergency Contacts      (Rel.) Home Phone Work Phone Mobile Phone    Jax Easley (Spouse) [phone] -- --    CooperYadi (Sister) [phone] -- --               Discharge Summary      Samuel Duane Kreis, MD at 6/12/2018  7:54 AM          Date of Discharge:  6/12/2018    Discharge Diagnosis:    Sepsis likely due to tick borne illness  Possible tick borne illness causing pancytopenia  Urinary tract infection  DMII  HTN  Chronic low back pain  Peripheral neuropathy  Fever    Hospital Course   Patient is a " 62 y.o. female presented to the emergency room for the second time in 2 days related to muscle spasms of the legs, nausea, diarrhea, fatigability, fevers chills and bodyaches had been progressive over the preceding few days.  Upon presentation was initially diagnosed with a urinary tract infection and sent home however symptoms worsened within 24 hours.  Upon return was noted to have a significant pancytopenia and noted to be febrile with diaphoresis.  Patient was admitted to the hospital floor and aggressively hydrated.  Muscle spasms overall have improved at this point.  She does endorse history of multiple tick bites over the preceding few weeks and it was felt that she likely had a tickborne illness.  She has been started on Rocephin and doxycycline during hospital stay.  Tickborne illness panel is currently pending.  White blood cell count this morning has improved to 2.41, hemoglobin is stable, platelets stable at 56,000.  Potassium noted mildly low at 3.4.  She's had no further fevers over the last 24 hours.  She states that overall is feeling markedly better.  She has had no further progression of spasms in her legs and overall feels that she has reached baseline.  As been maximized from a hospitalization standpoint and recommended for discharge home.  She will continue doxycycline 100 mg twice a day for the next 3 weeks.  We will have close Follow-up on outpatient basis with repeat labs later this week to ensure her blood counts continue to remain stable/improving.  She will follow-up with in our clinic within the next 2 weeks for ongoing evaluation of recovery effort.  We will continue to follow-up with her tickborne illness panel on an outpatient basis.    Procedures Performed  Imaging Results (last 72 hours)     Procedure Component Value Units Date/Time    XR Chest 1 View [[id number]] Collected:  06/10/18 2132     Updated:  06/10/18 2134    Narrative:       XR CHEST 1 VW-     CLINICAL INDICATION: soa           COMPARISON: None available      TECHNIQUE: Single frontal view of the chest.     FINDINGS:     There is no focal alveolar infiltrate or effusion.  The cardiac silhouette is normal. The pulmonary vasculature is  unremarkable.  There is no evidence of an acute osseous abnormality.   There are no suspicious-appearing parenchymal soft tissue nodules.            Impression:       No evidence of active or acute cardiopulmonary disease on today's chest  radiograph.         This report was finalized on 6/10/2018 9:32 PM by Dr. Vlad Squires MD.       CT Abdomen Pelvis With Contrast [[id number]] Collected:  06/10/18 2131     Updated:  06/10/18 2134    Narrative:       CT ABDOMEN PELVIS W CONTRAST-     CLINICAL INDICATION: uti          COMPARISON: None available     TECHNIQUE: Axial images were acquired from the lung bases through the  pubic symphysis after IV , but without oral contrast.  Reformatted images were created in both the coronal and sagittal planes.     Radiation dose reduction techniques were utilized per ALARA protocol.  Automated exposure control was initiated through either or CareDoServiceTitan or  DoseRight software packages by  protocol.           FINDINGS:   The lung bases are clear. There are no pleural effusions.     The liver is homogeneous. There is no evidence of focal hepatic mass     The spleen is homogeneous     There is no peripancreatic stranding or pancreatic head mass.     Small left adrenal nodule is present measuring 6.7 mm.     The kidneys show no evidence of hydronephrosis or hydroureter. I do not  see any distal ureteral stones.      Otherwise I do not see any free fluid or walled off fluid collections.     There is no bowel wall thickening or mesenteric stranding.     Postoperative changes seen in the lumbar spine. There is anterolisthesis  of L5 with respect to S1.       Impression:       : Small left adrenal nodule  The urinary bladder wall does not appear to be thickened on  today's  study.                   This report was finalized on 6/10/2018 9:32 PM by Dr. Vlad Squires MD.                  Consults:   Consults     Date and Time Order Name Status Description    6/10/2018 3355 Family [facility] (on-call MD unless specified)            Pertinent Test Results:   Lab Results (last 24 hours)     Procedure Component Value Units Date/Time    Stool Culture - Stool, Per Rectum [[id number]] Collected:  06/10/18 2151    Specimen:  Stool from Per Rectum Updated:  06/12/18 0930     Stool Culture Normal Fecal Twila    POC Glucose Once [[id number]]  (Abnormal) Collected:  06/12/18 0650    Specimen:  Blood Updated:  06/12/18 0657     Glucose 60 (L) mg/dL     Narrative:       Meter: [id number] : [id number] nina perea    CBC & Differential [[id number]] Collected:  06/12/18 0045    Specimen:  Blood Updated:  06/12/18 0314    Narrative:       The following orders were created for panel order CBC & Differential.  Procedure                               Abnormality         Status                     ---------                               -----------         ------                     Manual Differential[[id number]]          Abnormal            Final result               Scan Slide[[id number]]                                       Final result               CBC Auto Differential[[id number]]        Abnormal            Final result                 Please view results for these tests on the individual orders.    Manual Differential [[id number]]  (Abnormal) Collected:  06/12/18 0045    Specimen:  Blood Updated:  06/12/18 0314     Neutrophil % 34.0 %      Lymphocyte % 54.0 (H) %      Monocyte % 8.0 %      Basophil % 2.0 %      Bands %  2.0 (L) %      Neutrophils Absolute 0.87 (L) 10*3/mm3      Lymphocytes Absolute 1.30 10*3/mm3      Monocytes Absolute 0.19 10*3/mm3      Basophils Absolute 0.05 10*3/mm3      RBC Morphology Normal     Platelet Morphology Normal    CBC Auto Differential [[id number]]  (Abnormal)  Collected:  06/12/18 0045    Specimen:  Blood Updated:  06/12/18 0314     WBC 2.41 (C) 10*3/mm3      RBC 4.15 (L) 10*6/mm3      Hemoglobin 10.4 (L) g/dL      Hematocrit 34.0 (L) %      MCV 81.9 fL      MCH 25.1 (L) pg      MCHC 30.6 (L) g/dL      RDW 16.9 (H) %      RDW-SD 50.5 fl      MPV 11.4 (H) fL      Platelets 56 (L) 10*3/mm3     Scan Slide [[id number]] Collected:  06/12/18 0045    Specimen:  Blood Updated:  06/12/18 0314     Scan Slide --     Comment: See Manual Differential Results       Basic Metabolic Panel [[id number]]  (Abnormal) Collected:  06/12/18 0045    Specimen:  Blood Updated:  06/12/18 0259     Glucose 141 (H) mg/dL      BUN 18 mg/dL      Creatinine 0.99 mg/dL      Sodium 134 (L) mmol/L      Potassium 3.4 (L) mmol/L      Chloride 107 mmol/L      CO2 19.0 (L) mmol/L      Calcium 8.4 mg/dL      eGFR Non African Amer 57 (L) mL/min/1.73      BUN/Creatinine Ratio 18.2     Anion Gap 8.0 mmol/L     Narrative:       GFR Normal >60  Chronic Kidney Disease <60  Kidney Failure <15    Osmolality, Calculated [[id number]]  (Abnormal) Collected:  06/12/18 0045    Specimen:  Blood Updated:  06/12/18 0259     Osmolality Calc 272.5 (L) mOsm/kg     POC Glucose Once [[id number]]  (Abnormal) Collected:  06/11/18 2300    Specimen:  Blood Updated:  06/11/18 2306     Glucose 169 (H) mg/dL     Narrative:       Meter: [id number] : [id number] rick lin    POC Glucose Once [[id number]]  (Abnormal) Collected:  06/11/18 2221    Specimen:  Blood Updated:  06/11/18 2226     Glucose 55 (L) mg/dL     Narrative:       Meter: [id number] : [id number] Tracee Gtz    POC Glucose Once [[id number]]  (Abnormal) Collected:  06/11/18 2200    Specimen:  Blood Updated:  06/11/18 2207     Glucose 48 (C) mg/dL     Narrative:       Result Not Confirmed Meter: [id number] : [id number] stephen soni    POC Glucose Once [[id number]]  (Normal) Collected:  06/11/18 2010    Specimen:  Blood Updated:  06/11/18 2017     Glucose 89 mg/dL      Narrative:       Meter: [id number] : [id number] rick lin    POC Glucose Once [[id number]]  (Abnormal) Collected:  06/11/18 1638    Specimen:  Blood Updated:  06/11/18 1650     Glucose 60 (L) mg/dL     Narrative:       Meter: [id number] : [id number] RASHAD BATES    Blood Culture - Blood, [[id number]]  (Normal) Collected:  06/10/18 1413    Specimen:  Blood from Arm, Left Updated:  06/11/18 1445     Blood Culture No growth at 24 hours    Blood Culture - Blood, [[id number]]  (Normal) Collected:  06/10/18 1408    Specimen:  Blood from Arm, Right Updated:  06/11/18 1445     Blood Culture No growth at 24 hours    POC Glucose Once [[id number]]  (Normal) Collected:  06/11/18 1037    Specimen:  Blood Updated:  06/11/18 1052     Glucose 123 mg/dL     Narrative:       Meter: [id number] : [id number] [place] PAULO                                                  Condition on Discharge:  Stable    Physical Exam:     General Appearance:    Alert, cooperative, in no acute distress   Head:    Normocephalic, without obvious abnormality, atraumatic   Eyes:            Lids and lashes normal, conjunctivae and sclerae normal, no   icterus, no pallor, corneas clear, PERRLA   Ears:    Ears appear intact with no abnormalities noted   Throat:   No oral lesions, no thrush, oral mucosa moist   Neck:   No adenopathy, supple, trachea midline, no thyromegaly, no   carotid bruit, no JVD   Back:     No kyphosis present, no scoliosis present, no skin lesions,      erythema or scars, no tenderness to percussion or                   palpation,   range of motion normal   Lungs:     Clear to auscultation,respirations regular, even and                  unlabored    Heart:    Regular rhythm and normal rate, normal S1 and S2, no            murmur, no gallop, no rub, no click   Chest Wall:    No abnormalities observed   Abdomen:     Normal bowel sounds, no masses, no organomegaly, soft        non-tender, non-distended, no guarding, no rebound                 tenderness   Rectal:     Deferred   Extremities:   Moves all extremities well, no edema, no cyanosis, no             redness   Pulses:   Pulses palpable and equal bilaterally   Skin:   No bleeding, bruising or rash   Lymph nodes:   No palpable adenopathy   Neurologic:   Cranial nerves 2 - 12 grossly intact, sensation intact, DTR       present and equal bilaterally       Discharge Disposition  Home or Self Care    Discharge Medications     Discharge Medications      New Medications      Instructions Start Date   doxycycline 100 MG capsule  Commonly known as:  MONODOX   100 mg, Oral, Every 12 Hours Scheduled         Continue These Medications      Instructions Start Date   amitriptyline 25 MG tablet  Commonly known as:  ELAVIL   25 mg, Oral, Nightly      baclofen 10 MG tablet  Commonly known as:  LIORESAL   10 mg, Oral, 3 Times Daily      gabapentin 800 MG tablet  Commonly known as:  NEURONTIN   800 mg, Oral, 3 Times Daily      glipiZIDE 10 MG tablet  Commonly known as:  GLUCOTROL   10 mg, Oral, 2 Times Daily Before Meals      JARDIANCE 25 MG tablet  Generic drug:  Empagliflozin   25 mg, Oral, Daily      metFORMIN 1000 MG tablet  Commonly known as:  GLUCOPHAGE   1,000 mg, Oral, 2 Times Daily With Meals      metoprolol succinate XL 25 MG 24 hr tablet  Commonly known as:  TOPROL-XL   25 mg, Oral, Daily      ondansetron 4 MG tablet  Commonly known as:  ZOFRAN   4 mg, Oral, Every 8 Hours PRN      oxyCODONE-acetaminophen 7.5-325 MG per tablet  Commonly known as:  PERCOCET   1 tablet, Oral, Every 6 Hours PRN      pantoprazole 40 MG EC tablet  Commonly known as:  PROTONIX   40 mg, Oral, Daily      rosuvastatin 20 MG tablet  Commonly known as:  CRESTOR   20 mg, Oral, Nightly      TOUJEO SOLOSTAR 300 UNIT/ML solution pen-injector  Generic drug:  Insulin Glargine   58 Units, Subcutaneous, Daily      venlafaxine 75 MG tablet  Commonly known as:  EFFEXOR   75 mg, Oral, 2 Times Daily             Discharge Diet:     Diet Orders (active)     Start     Ordered    06/10/18 1959  Diet Regular  Diet Effective Now      06/10/18 2000          Follow-up Appointments  Dr. Woodard within 1 week  Additional Instructions for the Follow-ups that You Need to Schedule     Discharge Follow-up with PCP    As directed      Follow Up Details:  Dr. Woodard in 1 week               Test Results Pending at Discharge   Order Current Status    Babesia Microti Antibody Panel In process    Ehrlichia Antibody Panel In process    Lyme, Total Antibody Test / Reflex In process    Peripheral Blood Smear In process    Rickettsia Typhi (Typhus Fever) Antibody In process    Gardere SF (IgG / M) In process    Tularemia Antibody In process    Blood Culture - Blood, Preliminary result    Blood Culture - Blood, Preliminary result    Stool Culture - Stool, Per Rectum Preliminary result         Total time spent on date of discharge has been 40 minutes  Samuel Duane Kreis, MD  06/12/18  9:31 AM              Electronically signed by Samuel Duane Kreis, MD at 6/12/2018  9:32 AM

## 2018-06-15 LAB
BACTERIA SPEC AEROBE CULT: NORMAL
BACTERIA SPEC AEROBE CULT: NORMAL

## 2018-06-18 LAB
A PHAGOCYTOPH IGM TITR SER IF: NEGATIVE {TITER}
B MICROTI IGG TITR SER: NORMAL {TITER}
B MICROTI IGM TITR SER: NORMAL {TITER}
CONV HGE IGG TITER: NEGATIVE
E CHAFFEENSIS IGG TITR SER IF: NEGATIVE {TITER}
E. CHAFFEENSIS (HME) IGM TITER: NEGATIVE
F TULAR IGG TITR SER: ABNORMAL {TITER}
F TULAR IGM TITR SER: NEGATIVE {TITER}

## 2019-12-23 ENCOUNTER — HOSPITAL ENCOUNTER (EMERGENCY)
Facility: HOSPITAL | Age: 64
Discharge: HOME | End: 2019-12-23
Attending: EMERGENCY MEDICINE | Admitting: EMERGENCY MEDICINE

## 2019-12-23 VITALS
HEIGHT: 64 IN | OXYGEN SATURATION: 98 % | DIASTOLIC BLOOD PRESSURE: 61 MMHG | TEMPERATURE: 97.4 F | WEIGHT: 192 LBS | SYSTOLIC BLOOD PRESSURE: 101 MMHG | RESPIRATION RATE: 18 BRPM | HEART RATE: 77 BPM | BODY MASS INDEX: 32.78 KG/M2

## 2019-12-23 DIAGNOSIS — S91.109A OPEN WOUND OF TOE, INITIAL ENCOUNTER: Primary | ICD-10-CM

## 2019-12-23 LAB
BASOPHILS # BLD AUTO: 0.07 10*3/MM3 (ref 0–0.2)
BASOPHILS NFR BLD AUTO: 1.2 % (ref 0–1.5)
CRP SERPL-MCNC: 2.03 MG/DL (ref 0–0.5)
DEPRECATED RDW RBC AUTO: 48.6 FL (ref 37–54)
EOSINOPHIL # BLD AUTO: 0.14 10*3/MM3 (ref 0–0.4)
EOSINOPHIL NFR BLD AUTO: 2.4 % (ref 0.3–6.2)
ERYTHROCYTE [DISTWIDTH] IN BLOOD BY AUTOMATED COUNT: 15.9 % (ref 12.3–15.4)
HCT VFR BLD AUTO: 34.5 % (ref 34–46.6)
HGB BLD-MCNC: 10.1 G/DL (ref 12–15.9)
IMM GRANULOCYTES # BLD AUTO: 0.01 10*3/MM3 (ref 0–0.05)
IMM GRANULOCYTES NFR BLD AUTO: 0.2 % (ref 0–0.5)
LYMPHOCYTES # BLD AUTO: 1.97 10*3/MM3 (ref 0.7–3.1)
LYMPHOCYTES NFR BLD AUTO: 33.4 % (ref 19.6–45.3)
MCH RBC QN AUTO: 24.5 PG (ref 26.6–33)
MCHC RBC AUTO-ENTMCNC: 29.3 G/DL (ref 31.5–35.7)
MCV RBC AUTO: 83.5 FL (ref 79–97)
MONOCYTES # BLD AUTO: 0.37 10*3/MM3 (ref 0.1–0.9)
MONOCYTES NFR BLD AUTO: 6.3 % (ref 5–12)
NEUTROPHILS # BLD AUTO: 3.33 10*3/MM3 (ref 1.7–7)
NEUTROPHILS NFR BLD AUTO: 56.5 % (ref 42.7–76)
NRBC BLD AUTO-RTO: 0 /100 WBC (ref 0–0.2)
PLATELET # BLD AUTO: 188 10*3/MM3 (ref 140–450)
PMV BLD AUTO: 9.2 FL (ref 6–12)
RBC # BLD AUTO: 4.13 10*6/MM3 (ref 3.77–5.28)
WBC NRBC COR # BLD: 5.89 10*3/MM3 (ref 3.4–10.8)

## 2019-12-23 PROCEDURE — 99283 EMERGENCY DEPT VISIT LOW MDM: CPT

## 2019-12-23 PROCEDURE — 85025 COMPLETE CBC W/AUTO DIFF WBC: CPT | Performed by: PHYSICIAN ASSISTANT

## 2019-12-23 PROCEDURE — 86140 C-REACTIVE PROTEIN: CPT | Performed by: PHYSICIAN ASSISTANT

## 2019-12-23 NOTE — ED PROVIDER NOTES
Subjective     History provided by:  Patient   used: No    Wound Check   Location:  Right 2nd toe  Quality:  No pain  Severity:  Mild  Duration:  3 days (began 2 months ago, worsening over past 3 days)  Timing:  Constant  Progression:  Worsening  Chronicity:  New  Context:  No known trauma or injury  Relieved by:  Nothing  Worsened by:  Nothing  Ineffective treatments:  None troed  Associated symptoms: no abdominal pain, no chest pain, no fever and no myalgias    Associated symptoms comment:  Pt reports swelling, redness, and streaking last night but now resolved   Risk factors:  Diabetic      Review of Systems   Constitutional: Negative.  Negative for fever.   HENT: Negative.    Respiratory: Negative.    Cardiovascular: Negative.  Negative for chest pain.   Gastrointestinal: Negative.  Negative for abdominal pain.   Endocrine: Negative.    Genitourinary: Negative.  Negative for dysuria.   Musculoskeletal: Negative for myalgias.   Skin: Positive for wound.   Neurological: Negative.    Psychiatric/Behavioral: Negative.    All other systems reviewed and are negative.      Past Medical History:   Diagnosis Date   • Arthritis    • Depression    • Diabetes mellitus (CMS/HCC)    • Hyperlipidemia    • Hypertension    • Incontinence     B&B   • Infectious viral hepatitis    • Kidney stone        Allergies   Allergen Reactions   • Bee Venom Hives       Past Surgical History:   Procedure Laterality Date   • APPENDECTOMY     • CHOLECYSTECTOMY     • EYE SURGERY     • HEMORRHOIDECTOMY     • SPINE SURGERY      lumbar   • TEMPOROMANDIBULAR JOINT SURGERY     • TUBAL ABDOMINAL LIGATION         Family History   Problem Relation Age of Onset   • Diabetes Mother    • Heart disease Father        Social History     Socioeconomic History   • Marital status:      Spouse name: Not on file   • Number of children: Not on file   • Years of education: Not on file   • Highest education level: Not on file   Tobacco Use    • Smoking status: Former Smoker   Substance and Sexual Activity   • Alcohol use: No   • Drug use: No           Objective   Physical Exam   Constitutional: She is oriented to person, place, and time. She appears well-developed and well-nourished. No distress.   HENT:   Head: Normocephalic and atraumatic.   Right Ear: External ear normal.   Left Ear: External ear normal.   Nose: Nose normal.   Eyes: Pupils are equal, round, and reactive to light. Conjunctivae and EOM are normal.   Neck: Normal range of motion. Neck supple. No JVD present. No tracheal deviation present.   Cardiovascular: Normal rate, regular rhythm and normal heart sounds.   No murmur heard.  Pulmonary/Chest: Effort normal and breath sounds normal. No respiratory distress. She has no wheezes.   Abdominal: Soft. Bowel sounds are normal. There is no tenderness.   Musculoskeletal: Normal range of motion. She exhibits no edema or deformity.   Neurological: She is alert and oriented to person, place, and time. No cranial nerve deficit.   Skin: Skin is warm and dry. No rash noted. She is not diaphoretic. No erythema. No pallor.        Psychiatric: She has a normal mood and affect. Her behavior is normal. Thought content normal.   Nursing note and vitals reviewed.      Procedures           ED Course                      No data recorded                        MDM  Number of Diagnoses or Management Options  Open wound of toe, initial encounter: new and requires workup     Amount and/or Complexity of Data Reviewed  Clinical lab tests: reviewed and ordered    Risk of Complications, Morbidity, and/or Mortality  Presenting problems: moderate  Diagnostic procedures: low  Management options: moderate    Patient Progress  Patient progress: stable      Final diagnoses:   Open wound of toe, initial encounter              Kevin Floyd, TONY  12/23/19 1554

## 2020-05-14 ENCOUNTER — HOSPITAL ENCOUNTER (OUTPATIENT)
Dept: MAMMOGRAPHY | Facility: HOSPITAL | Age: 65
Discharge: HOME | End: 2020-05-14
Admitting: PHYSICIAN ASSISTANT

## 2020-05-14 ENCOUNTER — HOSPITAL ENCOUNTER (OUTPATIENT)
Dept: BONE DENSITY | Facility: HOSPITAL | Age: 65
Discharge: HOME | End: 2020-05-14

## 2020-05-14 DIAGNOSIS — M81.0 AGE-RELATED OSTEOPOROSIS WITHOUT CURRENT PATHOLOGICAL FRACTURE: ICD-10-CM

## 2020-05-14 DIAGNOSIS — Z12.31 VISIT FOR SCREENING MAMMOGRAM: ICD-10-CM

## 2020-05-14 PROCEDURE — 77080 DXA BONE DENSITY AXIAL: CPT | Performed by: RADIOLOGY

## 2020-05-14 PROCEDURE — 77063 BREAST TOMOSYNTHESIS BI: CPT | Performed by: RADIOLOGY

## 2020-05-14 PROCEDURE — 77067 SCR MAMMO BI INCL CAD: CPT

## 2020-05-14 PROCEDURE — 77067 SCR MAMMO BI INCL CAD: CPT | Performed by: RADIOLOGY

## 2020-05-14 PROCEDURE — 77080 DXA BONE DENSITY AXIAL: CPT

## 2020-05-14 PROCEDURE — 77063 BREAST TOMOSYNTHESIS BI: CPT

## 2021-02-03 ENCOUNTER — TRANSCRIBE ORDERS (OUTPATIENT)
Dept: ADMINISTRATIVE | Facility: HOSPITAL | Age: 66
End: 2021-02-03

## 2021-02-03 DIAGNOSIS — Z01.818 PREOP EXAMINATION: Primary | ICD-10-CM

## 2021-02-05 ENCOUNTER — LAB (OUTPATIENT)
Dept: LAB | Facility: HOSPITAL | Age: 66
End: 2021-02-05

## 2021-02-05 DIAGNOSIS — Z01.818 PREOP EXAMINATION: ICD-10-CM

## 2021-02-05 PROCEDURE — U0004 COV-19 TEST NON-CDC HGH THRU: HCPCS

## 2021-02-05 PROCEDURE — C9803 HOPD COVID-19 SPEC COLLECT: HCPCS

## 2021-02-06 LAB — SARS-COV-2 RNA RESP QL NAA+PROBE: NOT DETECTED

## 2021-03-08 ENCOUNTER — TRANSCRIBE ORDERS (OUTPATIENT)
Dept: ADMINISTRATIVE | Facility: HOSPITAL | Age: 66
End: 2021-03-08

## 2021-03-08 DIAGNOSIS — Z01.818 PRE-OPERATIVE CLEARANCE: Primary | ICD-10-CM

## 2021-03-10 ENCOUNTER — LAB (OUTPATIENT)
Dept: LAB | Facility: HOSPITAL | Age: 66
End: 2021-03-10

## 2021-03-10 DIAGNOSIS — Z01.818 PRE-OPERATIVE CLEARANCE: ICD-10-CM

## 2021-03-10 PROCEDURE — U0004 COV-19 TEST NON-CDC HGH THRU: HCPCS

## 2021-03-10 PROCEDURE — C9803 HOPD COVID-19 SPEC COLLECT: HCPCS

## 2021-03-11 LAB — SARS-COV-2 RNA NOSE QL NAA+PROBE: NOT DETECTED

## 2021-09-19 ENCOUNTER — LAB (OUTPATIENT)
Dept: LAB | Facility: HOSPITAL | Age: 66
End: 2021-09-19

## 2021-09-19 ENCOUNTER — TRANSCRIBE ORDERS (OUTPATIENT)
Dept: ADMINISTRATIVE | Facility: HOSPITAL | Age: 66
End: 2021-09-19

## 2021-09-19 DIAGNOSIS — Z20.828 EXPOSURE TO SARS-ASSOCIATED CORONAVIRUS: ICD-10-CM

## 2021-09-19 DIAGNOSIS — Z20.828 EXPOSURE TO SARS-ASSOCIATED CORONAVIRUS: Primary | ICD-10-CM

## 2021-09-19 PROCEDURE — U0004 COV-19 TEST NON-CDC HGH THRU: HCPCS | Performed by: FAMILY MEDICINE

## 2021-09-20 LAB — SARS-COV-2 RNA NOSE QL NAA+PROBE: NOT DETECTED

## 2021-10-26 ENCOUNTER — TRANSCRIBE ORDERS (OUTPATIENT)
Dept: ADMINISTRATIVE | Facility: HOSPITAL | Age: 66
End: 2021-10-26

## 2021-10-26 DIAGNOSIS — G89.29 CHRONIC MIDLINE LOW BACK PAIN WITHOUT SCIATICA: Primary | ICD-10-CM

## 2021-10-26 DIAGNOSIS — M54.50 CHRONIC MIDLINE LOW BACK PAIN WITHOUT SCIATICA: Primary | ICD-10-CM

## 2021-11-05 ENCOUNTER — HOSPITAL ENCOUNTER (OUTPATIENT)
Dept: MRI IMAGING | Facility: HOSPITAL | Age: 66
Discharge: HOME | End: 2021-11-05
Admitting: NURSE PRACTITIONER

## 2021-11-05 DIAGNOSIS — G89.29 CHRONIC MIDLINE LOW BACK PAIN WITHOUT SCIATICA: ICD-10-CM

## 2021-11-05 DIAGNOSIS — M54.50 CHRONIC MIDLINE LOW BACK PAIN WITHOUT SCIATICA: ICD-10-CM

## 2021-11-05 LAB — CREAT BLDA-MCNC: 1.3 MG/DL (ref 0.6–1.3)

## 2021-11-05 PROCEDURE — 72158 MRI LUMBAR SPINE W/O & W/DYE: CPT | Performed by: RADIOLOGY

## 2021-11-05 PROCEDURE — 0 GADOBENATE DIMEGLUMINE 529 MG/ML SOLUTION

## 2021-11-05 PROCEDURE — 72158 MRI LUMBAR SPINE W/O & W/DYE: CPT

## 2021-11-05 PROCEDURE — 82565 ASSAY OF CREATININE: CPT

## 2021-11-05 PROCEDURE — 0 GADOBENATE DIMEGLUMINE 529 MG/ML SOLUTION: Performed by: NURSE PRACTITIONER

## 2021-11-05 PROCEDURE — A9577 INJ MULTIHANCE: HCPCS

## 2021-11-05 PROCEDURE — A9577 INJ MULTIHANCE: HCPCS | Performed by: NURSE PRACTITIONER

## 2021-11-05 RX ADMIN — GADOBENATE DIMEGLUMINE 18 ML: 529 INJECTION, SOLUTION INTRAVENOUS at 11:30

## 2021-12-27 ENCOUNTER — TRANSCRIBE ORDERS (OUTPATIENT)
Dept: ADMINISTRATIVE | Facility: HOSPITAL | Age: 66
End: 2021-12-27

## 2021-12-27 DIAGNOSIS — M25.571 ACUTE RIGHT ANKLE PAIN: Primary | ICD-10-CM

## 2021-12-30 ENCOUNTER — HOSPITAL ENCOUNTER (EMERGENCY)
Facility: HOSPITAL | Age: 66
Discharge: LEFT BEFORE DISCHARGE | End: 2021-12-30

## 2021-12-30 VITALS
SYSTOLIC BLOOD PRESSURE: 118 MMHG | HEART RATE: 118 BPM | BODY MASS INDEX: 33.29 KG/M2 | TEMPERATURE: 98 F | OXYGEN SATURATION: 95 % | DIASTOLIC BLOOD PRESSURE: 61 MMHG | RESPIRATION RATE: 16 BRPM | WEIGHT: 195 LBS | HEIGHT: 64 IN

## 2021-12-30 LAB
HOLD SPECIMEN: NORMAL
HOLD SPECIMEN: NORMAL
WHOLE BLOOD HOLD SPECIMEN: NORMAL
WHOLE BLOOD HOLD SPECIMEN: NORMAL

## 2021-12-30 PROCEDURE — 99202 OFFICE O/P NEW SF 15 MIN: CPT

## 2022-01-19 ENCOUNTER — HOSPITAL ENCOUNTER (OUTPATIENT)
Dept: MRI IMAGING | Facility: HOSPITAL | Age: 67
Discharge: HOME | End: 2022-01-19

## 2022-01-19 DIAGNOSIS — M25.571 ACUTE RIGHT ANKLE PAIN: ICD-10-CM

## 2022-01-19 PROCEDURE — 73721 MRI JNT OF LWR EXTRE W/O DYE: CPT | Performed by: RADIOLOGY

## 2022-01-19 PROCEDURE — 73721 MRI JNT OF LWR EXTRE W/O DYE: CPT

## 2022-01-24 ENCOUNTER — TRANSCRIBE ORDERS (OUTPATIENT)
Dept: PHYSICAL THERAPY | Facility: CLINIC | Age: 67
End: 2022-01-24

## 2022-01-24 DIAGNOSIS — M25.571 ACUTE RIGHT ANKLE PAIN: Primary | ICD-10-CM

## 2022-02-03 ENCOUNTER — TELEPHONE (OUTPATIENT)
Dept: PHYSICAL THERAPY | Facility: CLINIC | Age: 67
End: 2022-02-03

## 2023-08-10 ENCOUNTER — LAB (OUTPATIENT)
Dept: LAB | Facility: HOSPITAL | Age: 68
End: 2023-08-10
Payer: MEDICARE

## 2023-08-10 ENCOUNTER — OFFICE VISIT (OUTPATIENT)
Dept: CARDIOLOGY | Facility: HOSPITAL | Age: 68
End: 2023-08-10
Payer: MEDICARE

## 2023-08-10 ENCOUNTER — HOSPITAL ENCOUNTER (OUTPATIENT)
Dept: CARDIOLOGY | Facility: HOSPITAL | Age: 68
Discharge: HOME | End: 2023-08-10
Payer: MEDICARE

## 2023-08-10 VITALS
TEMPERATURE: 97.6 F | SYSTOLIC BLOOD PRESSURE: 134 MMHG | BODY MASS INDEX: 31.12 KG/M2 | DIASTOLIC BLOOD PRESSURE: 68 MMHG | OXYGEN SATURATION: 96 % | HEIGHT: 65 IN | WEIGHT: 186.8 LBS | RESPIRATION RATE: 16 BRPM | HEART RATE: 76 BPM

## 2023-08-10 DIAGNOSIS — I44.7 LBBB (LEFT BUNDLE BRANCH BLOCK): Primary | ICD-10-CM

## 2023-08-10 DIAGNOSIS — R94.31 ABNORMAL ECG: ICD-10-CM

## 2023-08-10 DIAGNOSIS — E87.5 HYPERKALEMIA: ICD-10-CM

## 2023-08-10 DIAGNOSIS — I10 PRIMARY HYPERTENSION: ICD-10-CM

## 2023-08-10 DIAGNOSIS — I44.7 LBBB (LEFT BUNDLE BRANCH BLOCK): ICD-10-CM

## 2023-08-10 DIAGNOSIS — E78.5 HYPERLIPIDEMIA, UNSPECIFIED HYPERLIPIDEMIA TYPE: ICD-10-CM

## 2023-08-10 LAB
ANION GAP SERPL CALCULATED.3IONS-SCNC: 11.9 MMOL/L (ref 5–15)
BUN SERPL-MCNC: 16 MG/DL (ref 8–23)
BUN/CREAT SERPL: 14.7 (ref 7–25)
CALCIUM SPEC-SCNC: 9.5 MG/DL (ref 8.6–10.5)
CHLORIDE SERPL-SCNC: 105 MMOL/L (ref 98–107)
CO2 SERPL-SCNC: 24.1 MMOL/L (ref 22–29)
CREAT SERPL-MCNC: 1.09 MG/DL (ref 0.57–1)
EGFRCR SERPLBLD CKD-EPI 2021: 55.8 ML/MIN/1.73
GLUCOSE SERPL-MCNC: 117 MG/DL (ref 65–99)
POTASSIUM SERPL-SCNC: 4.9 MMOL/L (ref 3.5–5.2)
QT INTERVAL: 398 MS
QTC INTERVAL: 447 MS
SODIUM SERPL-SCNC: 141 MMOL/L (ref 136–145)

## 2023-08-10 PROCEDURE — 80048 BASIC METABOLIC PNL TOTAL CA: CPT

## 2023-08-10 PROCEDURE — 93005 ELECTROCARDIOGRAM TRACING: CPT | Performed by: NURSE PRACTITIONER

## 2023-08-10 PROCEDURE — 36415 COLL VENOUS BLD VENIPUNCTURE: CPT

## 2023-08-10 RX ORDER — ISOSORBIDE MONONITRATE 30 MG/1
30 TABLET, EXTENDED RELEASE ORAL DAILY
COMMUNITY

## 2023-08-10 RX ORDER — LOSARTAN POTASSIUM 25 MG/1
25 TABLET ORAL DAILY
COMMUNITY
Start: 2023-07-08

## 2023-08-10 RX ORDER — INSULIN GLARGINE 100 [IU]/ML
30 INJECTION, SOLUTION SUBCUTANEOUS DAILY
COMMUNITY
Start: 2023-07-02

## 2023-08-10 RX ORDER — ASPIRIN 81 MG/1
81 TABLET ORAL DAILY
COMMUNITY

## 2023-08-10 RX ORDER — PREGABALIN 150 MG/1
150 CAPSULE ORAL NIGHTLY
COMMUNITY

## 2023-08-10 RX ORDER — PRAMIPEXOLE DIHYDROCHLORIDE 0.25 MG/1
0.25 TABLET ORAL NIGHTLY
COMMUNITY
Start: 2023-07-18

## 2023-08-10 RX ORDER — INSULIN LISPRO 100 [IU]/ML
INJECTION, SOLUTION INTRAVENOUS; SUBCUTANEOUS 3 TIMES DAILY
COMMUNITY
Start: 2023-08-09

## 2023-08-10 NOTE — PATIENT INSTRUCTIONS
- Lab work on 7th floor    - Office will call to schedule testing  - Office will call or send message with testing results    - [facility] will call to schedule an appointment

## 2023-08-10 NOTE — PROGRESS NOTES
"Chief Complaint  Abnormal ECG    Subjective      History of Present Illness {  Problem List  Visit  Diagnosis   Encounters  Notes  Medications  Labs  Result Review Imaging  Media :23}     Divine Stewart, 67 y.o. female with left bundle branch block, HTN, HLD, diabetes presents to [facility] for Abnormal ECG.    Patient presents today for abnormal ECG at pre-procedure testing before planned hemorrhoid surgery. Prior ECG 2018 with left bundle branch block. ECG today with NSR, LBBB.     Presents today overall asymptomatic from a cardiovascular standpoint, only complaint of mild non-limiting dyspnea when walking [place]. She has scheduled hemorrhoid surgery and bowel surgery in approximately one week. She is somewhat of a limited historian, but does not believe she has had a stress test or echocardiogram in the past 5 years (last ECG with LBBB). Reports she previously followed with Dr. Jensen in [place], [place] for cardiology care; last visit approximately 5 years ago. She denies chest pain, tachypalpitation, near syncope/syncope.      Objective     Vital Signs:   Vitals:    08/10/23 0949 08/10/23 0951 08/10/23 0952   BP: 132/68 131/73 134/68   BP Location: Right arm Left arm Left arm   Patient Position: Sitting Standing Sitting   Cuff Size: Adult Adult Adult   Pulse: 76 83 76   Resp:   16   Temp: 97.6 øF (36.4 øC) 97.6 øF (36.4 øC) 97.6 øF (36.4 øC)   TempSrc: Temporal Temporal Temporal   SpO2: 96% 95% 96%   Weight:   84.7 kg (186 lb 12.8 oz)   Height:   163.8 cm (64.5\")     Body mass index is 31.57 kg/mý.  Physical Exam  Vitals and nursing note reviewed.   Constitutional:       Appearance: Normal appearance.   HENT:      Head: Normocephalic.   Eyes:      Extraocular Movements: Extraocular movements intact.   Neck:      Vascular: No carotid bruit.   Cardiovascular:      Rate and Rhythm: Normal rate and regular rhythm.      Pulses: Normal pulses.      Heart sounds: Normal " heart sounds, S1 normal and S2 normal. No murmur heard.  Pulmonary:      Effort: Pulmonary effort is normal. No respiratory distress.      Breath sounds: Normal breath sounds.   Musculoskeletal:      Cervical back: Neck supple.      Right lower leg: No edema.      Left lower leg: No edema.   Skin:     General: Skin is warm and dry.   Neurological:      General: No focal deficit present.      Mental Status: She is alert.   Psychiatric:         Mood and Affect: Mood normal.         Behavior: Behavior normal.         Thought Content: Thought content normal.      Data Reviewed:{ Labs  Result Review  Imaging  Med Tab  Media :23}     CBC with automated diff (08/03/2023 10:08)  Vitamin D, 25-Hydroxy(SENDOUT) (08/03/2023 10:08)  LDL Cholesterol, Direct(SENDOUT) (08/03/2023 10:08)  TSH (08/03/2023 10:08)  LIPID PANEL (08/03/2023 10:08)  COMPREHENSIVE METABOLIC PANEL (08/03/2023 10:08)    Assessment & Plan   Assessment and Plan {CC Problem List  Visit Diagnosis  ROS  Review (Popup)  Health Maintenance  Quality  BestPractice  Medications  SmartSets  SnapShot Encounters  Media :23}     1. LBBB (left bundle branch block)  -Left bundle branch block with unknown chronicity; previous ECG in 2018 with LBBB.   -Patient presents no recent workup, and does not believe stress test/echocardiogram completed with LBBB  -Denies tachypalpitation, chest pain, syncope.   -Given her LBBB with unclear workup in past will complete TTE, stress test before upcoming surgery   - Adult Transthoracic Echo Complete W/ Cont if Necessary Per Protocol; Future  - Stress Test With Myocardial Perfusion (1 Day); Future  -Continue Toprol-XL  - Ambulatory Referral to Cardiology  -Cardiac risk stratification pending testing as above.     2. Primary hypertension  -Reasonable control today  -Continue losartan, imdur  -Discussed ambulatory BP monitoring at home    3. Hyperkalemia  -Recent CMP with elevated potassium at 5.4  -Repeat BMP today  - Basic  Metabolic Panel; Future    4. Hyperlipidemia  -Continue rosuvastatin as prescribed      Follow Up {Instructions Charge Capture  Follow-up Communications :23}     Return if symptoms worsen or fail to improve.      Patient was given instructions and counseling regarding her condition or for health maintenance advice. Please see specific information pulled into the AVS if appropriate.  Patient was instructed to call the [facility] with any questions, concerns, or worsening symptoms.    Dictated Utilizing Dragon Dictation   Please note that portions of this note were completed with a voice recognition program.   Part of this note may be an electronic transcription/translation of spoken language to printed text using the Dragon Dictation System.

## 2023-08-11 ENCOUNTER — TELEPHONE (OUTPATIENT)
Dept: CARDIOLOGY | Facility: HOSPITAL | Age: 68
End: 2023-08-11
Payer: MEDICARE

## 2023-08-11 ENCOUNTER — PATIENT ROUNDING (BHMG ONLY) (OUTPATIENT)
Dept: CARDIOLOGY | Facility: HOSPITAL | Age: 68
End: 2023-08-11
Payer: MEDICARE

## 2023-08-11 NOTE — TELEPHONE ENCOUNTER
----- Message from KELLY Singh sent at 8/11/2023 10:56 AM EDT -----  Could you let patient know potassium has returned to normal, kidney function is stable.  Continue with planned cardiac testing before her scheduled surgery.    Thanks

## 2023-08-11 NOTE — PROGRESS NOTES
Could you let patient know potassium has returned to normal, kidney function is stable.  Continue with planned cardiac testing before her scheduled surgery.    Thanks

## 2023-08-11 NOTE — PROGRESS NOTES
August 11, 2023    Hello, may I speak with Divine Stewart?    My name is Kiya QUEEN.       I am  with [facility]  [address]  [facility] [address] [phone].    Before we get started may I verify your date of birth? 1955    I am calling to officially welcome you to our practice and ask about your recent visit. Is this a good time to talk? yes    Tell me about your visit with us. What things went well?  Everything went great. I really liked [place]. Everyone was nice.       We're always looking for ways to make our patients' experiences even better. Do you have recommendations on ways we may improve?  no    Overall were you satisfied with your first visit to our practice? yes       I appreciate you taking the time to speak with me today. Is there anything else I can do for you? no      Thank you, and have a great day.

## 2023-08-15 ENCOUNTER — TELEPHONE (OUTPATIENT)
Dept: CARDIOLOGY | Facility: HOSPITAL | Age: 68
End: 2023-08-15
Payer: MEDICARE

## 2023-08-15 ENCOUNTER — HOSPITAL ENCOUNTER (OUTPATIENT)
Dept: CARDIOLOGY | Facility: HOSPITAL | Age: 68
Discharge: HOME | End: 2023-08-15
Payer: MEDICARE

## 2023-08-15 DIAGNOSIS — R94.31 ABNORMAL ECG: ICD-10-CM

## 2023-08-15 DIAGNOSIS — I44.7 LBBB (LEFT BUNDLE BRANCH BLOCK): ICD-10-CM

## 2023-08-15 DIAGNOSIS — R94.39 ABNORMAL NUCLEAR STRESS TEST: Primary | ICD-10-CM

## 2023-08-15 LAB
BH CV ECHO MEAS - ACS: 2.13 CM
BH CV ECHO MEAS - AO MAX PG: 7.5 MMHG
BH CV ECHO MEAS - AO MEAN PG: 4 MMHG
BH CV ECHO MEAS - AO ROOT DIAM: 3.3 CM
BH CV ECHO MEAS - AO V2 MAX: 137 CM/SEC
BH CV ECHO MEAS - AO V2 VTI: 29.1 CM
BH CV ECHO MEAS - EDV(CUBED): 67.9 ML
BH CV ECHO MEAS - EDV(MOD-SP4): 51 ML
BH CV ECHO MEAS - EF(MOD-SP4): 67.8 %
BH CV ECHO MEAS - EF_3D-VOL: 43 %
BH CV ECHO MEAS - ESV(CUBED): 15.6 ML
BH CV ECHO MEAS - ESV(MOD-SP4): 16.4 ML
BH CV ECHO MEAS - FS: 38.7 %
BH CV ECHO MEAS - IVS/LVPW: 1 CM
BH CV ECHO MEAS - IVSD: 1.14 CM
BH CV ECHO MEAS - LA DIMENSION: 4.1 CM
BH CV ECHO MEAS - LAT PEAK E' VEL: 6.6 CM/SEC
BH CV ECHO MEAS - LV DIASTOLIC VOL/BSA (35-75): 26.9 CM2
BH CV ECHO MEAS - LV MASS(C)D: 158.1 GRAMS
BH CV ECHO MEAS - LV SYSTOLIC VOL/BSA (12-30): 8.6 CM2
BH CV ECHO MEAS - LVIDD: 4.1 CM
BH CV ECHO MEAS - LVIDS: 2.5 CM
BH CV ECHO MEAS - LVPWD: 1.14 CM
BH CV ECHO MEAS - MED PEAK E' VEL: 4.1 CM/SEC
BH CV ECHO MEAS - MV A MAX VEL: 110 CM/SEC
BH CV ECHO MEAS - MV DEC TIME: 0.25 MSEC
BH CV ECHO MEAS - MV E MAX VEL: 73.3 CM/SEC
BH CV ECHO MEAS - MV E/A: 0.67
BH CV ECHO MEAS - SI(MOD-SP4): 18.2 ML/M2
BH CV ECHO MEAS - SV(MOD-SP4): 34.6 ML
BH CV ECHO MEASUREMENTS AVERAGE E/E' RATIO: 13.7
BH CV REST NUCLEAR ISOTOPE DOSE: 10 MCI
BH CV STRESS COMMENTS STAGE 1: NORMAL
BH CV STRESS DOSE REGADENOSON STAGE 1: 0.4
BH CV STRESS DURATION MIN STAGE 1: 0
BH CV STRESS DURATION SEC STAGE 1: 10
BH CV STRESS NUCLEAR ISOTOPE DOSE: 30 MCI
BH CV STRESS PROTOCOL 1: NORMAL
BH CV STRESS RECOVERY BP: NORMAL MMHG
BH CV STRESS RECOVERY HR: 90 BPM
BH CV STRESS STAGE 1: 1
BH CV XLRA - RV BASE: 3.1 CM
BH CV XLRA - RV LENGTH: 7.1 CM
BH CV XLRA - RV MID: 2.7 CM
LEFT ATRIUM VOLUME INDEX: 15 ML/M2
LV EF NUC BP: 50 %
MAXIMAL PREDICTED HEART RATE: 153 BPM
PERCENT MAX PREDICTED HR: 61.44 %
STRESS BASELINE BP: NORMAL MMHG
STRESS BASELINE HR: 74 BPM
STRESS PERCENT HR: 72 %
STRESS POST PEAK BP: NORMAL MMHG
STRESS POST PEAK HR: 94 BPM
STRESS TARGET HR: 130 BPM

## 2023-08-15 PROCEDURE — 25010000002 REGADENOSON 0.4 MG/5ML SOLUTION: Performed by: SPECIALIST

## 2023-08-15 PROCEDURE — 93306 TTE W/DOPPLER COMPLETE: CPT

## 2023-08-15 PROCEDURE — 78452 HT MUSCLE IMAGE SPECT MULT: CPT

## 2023-08-15 PROCEDURE — 93306 TTE W/DOPPLER COMPLETE: CPT | Performed by: SPECIALIST

## 2023-08-15 PROCEDURE — A9500 TC99M SESTAMIBI: HCPCS | Performed by: SPECIALIST

## 2023-08-15 PROCEDURE — 0 TECHNETIUM SESTAMIBI: Performed by: SPECIALIST

## 2023-08-15 PROCEDURE — 93018 CV STRESS TEST I&R ONLY: CPT | Performed by: SPECIALIST

## 2023-08-15 PROCEDURE — 93017 CV STRESS TEST TRACING ONLY: CPT

## 2023-08-15 PROCEDURE — 78452 HT MUSCLE IMAGE SPECT MULT: CPT | Performed by: SPECIALIST

## 2023-08-15 RX ORDER — REGADENOSON 0.08 MG/ML
0.4 INJECTION, SOLUTION INTRAVENOUS
Status: COMPLETED | OUTPATIENT
Start: 2023-08-15 | End: 2023-08-15

## 2023-08-15 RX ADMIN — TECHNETIUM TC 99M SESTAMIBI 1 DOSE: 1 INJECTION INTRAVENOUS at 08:17

## 2023-08-15 RX ADMIN — REGADENOSON 0.4 MG: 0.08 INJECTION, SOLUTION INTRAVENOUS at 09:29

## 2023-08-15 RX ADMIN — TECHNETIUM TC 99M SESTAMIBI 1 DOSE: 1 INJECTION INTRAVENOUS at 09:29

## 2023-08-15 NOTE — TELEPHONE ENCOUNTER
Contacted surgical group and left voicemail regarding surgical clearance. Recommendation to delay planned surgery until further cardiac evaluation after recent abnormal stress test. Discussed with patient earlier today also, and recommended she contact surgical team to notify of abnormal stress test and need to delay planned procedure.

## 2023-08-15 NOTE — PROGRESS NOTES
Discussed echocardiogram, abnormal stress test result with patient.  Stress test with fixed apical, apical septal and apical inferior wall defect consistent with infarction of the above area with reversible mid to distal inferolateral wall defect consistent with ischemia.     Patient denies chest pain since previous evaluation.  Discussed contacting colorectal surgery team to delay hemorrhoid surgery until after evaluated by cardiology. Continue current cardiac medications including ASA, metoprolol, Imdur, rosuvastatin. ED precautions if she develops chest pain/shortness of breath.     Contacted referral coordinator to help change [place] cardiology referral to [place], given her abnormal stress test.

## 2023-08-15 NOTE — TELEPHONE ENCOUNTER
Brynn with Dr. Walton's office is calling regarding surgical clearance for patient's surgery scheduled on Thursday. She is wanting to know if patient has been cleared after her stress test that was done today. She can be reached at [phone].

## 2023-08-22 ENCOUNTER — OFFICE VISIT (OUTPATIENT)
Dept: CARDIOLOGY | Facility: CLINIC | Age: 68
End: 2023-08-22
Payer: MEDICARE

## 2023-08-22 VITALS
DIASTOLIC BLOOD PRESSURE: 68 MMHG | HEART RATE: 74 BPM | SYSTOLIC BLOOD PRESSURE: 128 MMHG | BODY MASS INDEX: 30.66 KG/M2 | WEIGHT: 184 LBS | OXYGEN SATURATION: 95 % | HEIGHT: 65 IN

## 2023-08-22 DIAGNOSIS — Z01.810 PREOPERATIVE CARDIOVASCULAR EXAMINATION: ICD-10-CM

## 2023-08-22 DIAGNOSIS — E11.9 TYPE 2 DIABETES MELLITUS WITHOUT COMPLICATION, WITHOUT LONG-TERM CURRENT USE OF INSULIN: ICD-10-CM

## 2023-08-22 DIAGNOSIS — Z91.89 MULTIPLE RISK FACTORS FOR CORONARY ARTERY DISEASE: ICD-10-CM

## 2023-08-22 DIAGNOSIS — Z78.9 ELECTRONIC CIGARETTE USE: ICD-10-CM

## 2023-08-22 DIAGNOSIS — R94.39 ABNORMAL NUCLEAR STRESS TEST: Primary | ICD-10-CM

## 2023-08-22 DIAGNOSIS — R07.9 CHEST PAIN, UNSPECIFIED TYPE: ICD-10-CM

## 2023-08-22 PROCEDURE — 93000 ELECTROCARDIOGRAM COMPLETE: CPT | Performed by: NURSE PRACTITIONER

## 2023-08-22 PROCEDURE — 1160F RVW MEDS BY RX/DR IN RCRD: CPT | Performed by: NURSE PRACTITIONER

## 2023-08-22 PROCEDURE — 99214 OFFICE O/P EST MOD 30 MIN: CPT | Performed by: NURSE PRACTITIONER

## 2023-08-22 PROCEDURE — 1159F MED LIST DOCD IN RCRD: CPT | Performed by: NURSE PRACTITIONER

## 2023-08-22 NOTE — PROGRESS NOTES
"Subjective     Divine Stewart is a 67 y.o. female.   Chief Complaint   Patient presents with    [facility]     Abnormal ECG and Echo     History of Present Illness   Divine Stewart is a 67-year-old female who presents to clinic today as a new patient for cardiology evaluation.  She is accompanied by her sister, Liliana.     She was referred for cardiac evaluation.  She was initially seen for preoperative clearance and reports she had an EKG at [facility].  She was planning to have a prolapsed bladder repair and bowel/hemorrhoid surgery. No cardiac clearance form sent from surgeon.  She was subsequently referred to [facility] and seen by Nilesh Eller who had ordered a nuclear stress test and echocardiogram.  She presents to [facility] as a new patient today to discuss results.  She reports she was advised she would likely need cardiac cath prior to surgery clearance.  She does report previous cardiac testing and after testing was placed on Imdur and Toprol.  No results are available for review of prior cardiac testing.  She does have a chronic left bundle branch block EKG pattern since 2018.    She does report intermittent chest discomfort that radiates up into her neck and she can drink something and belch which improves symptoms.  She feels it is primarily related to \"gas.\"  Cardiac risk factors are HTN currently on Losartan and Toprol.  Dyslipidemia currently on Crestor.  Most recent lipid from August with LDL goal less than 70.  Underlying DM managed by PCP with a recent hemoglobin A1c of 8.4.  Family history of CABG in 2 siblings, father with MI and bother with MI and stents.     Patient Active Problem List   Diagnosis    Pancytopenia     Past Medical History:   Diagnosis Date    Arthritis     Depression     Diabetes mellitus     Hyperlipidemia     Hypertension     Incontinence     B&B    Infectious viral hepatitis     Kidney stone      Past Surgical History:   Procedure " Laterality Date    APPENDECTOMY      BREAST BIOPSY Right     benign    CHOLECYSTECTOMY      EYE SURGERY      HEMORRHOIDECTOMY      SPINE SURGERY      lumbar    TEMPOROMANDIBULAR JOINT SURGERY      TUBAL ABDOMINAL LIGATION         Family History   Problem Relation Age of Onset    Diabetes Mother     Heart disease Father     No Known Problems Maternal Grandmother     No Known Problems Maternal Grandfather     No Known Problems Paternal Grandmother     No Known Problems Paternal Grandfather     Breast cancer Neg Hx      Social History     Tobacco Use    Smoking status: Former     Packs/day: 1.00     Years: 25.00     Pack years: 25.00     Types: Cigarettes     Quit date: 2000     Years since quittin.6     Passive exposure: Past    Smokeless tobacco: Never   Vaping Use    Vaping Use: Never used   Substance Use Topics    Alcohol use: No    Drug use: No         The following portions of the patient's history were reviewed and updated as appropriate: allergies, current medications, past family history, past medical history, past social history, past surgical history and problem list.    Allergies   Allergen Reactions    Bee Venom Hives         Current Outpatient Medications:     Insulin Lispro, 1 Unit Dial, (HUMALOG) 100 UNIT/ML solution pen-injector, Inject  under the skin into the appropriate area as directed 3 (Three) Times a Day., Disp: , Rfl:     isosorbide mononitrate (IMDUR) 30 MG 24 hr tablet, Take 1 tablet by mouth Daily., Disp: , Rfl:     Lantus SoloStar 100 UNIT/ML injection pen, Inject 30 Units under the skin into the appropriate area as directed Daily., Disp: , Rfl:     losartan (COZAAR) 25 MG tablet, Take 1 tablet by mouth Daily. for blood pressure, Disp: , Rfl:     metFORMIN (GLUCOPHAGE) 1000 MG tablet, Take 1 tablet by mouth 2 (Two) Times a Day With Meals., Disp: , Rfl:     metoprolol succinate XL (TOPROL-XL) 25 MG 24 hr tablet, Take 1 tablet by mouth Daily., Disp: , Rfl:     oxyCODONE-acetaminophen  (PERCOCET) 7.5-325 MG per tablet, Take 1 tablet by mouth Every 6 (Six) Hours As Needed., Disp: , Rfl:     pantoprazole (PROTONIX) 40 MG EC tablet, Take 1 tablet by mouth Daily., Disp: , Rfl:     pramipexole (MIRAPEX) 0.25 MG tablet, Take 1 tablet by mouth Every Night., Disp: , Rfl:     pregabalin (LYRICA) 150 MG capsule, Take 1 capsule by mouth Every Night., Disp: , Rfl:     rosuvastatin (CRESTOR) 20 MG tablet, Take 1 tablet by mouth Every Night., Disp: , Rfl:     venlafaxine (EFFEXOR) 75 MG tablet, Take 1 tablet by mouth 2 (Two) Times a Day., Disp: , Rfl:     aspirin 81 MG EC tablet, Take 1 tablet by mouth Daily. (Patient not taking: Reported on 8/22/2023), Disp: , Rfl:     Review of Systems   Constitutional:  Negative for activity change, appetite change, chills, diaphoresis, fatigue and fever.   HENT:  Negative for congestion, drooling, ear discharge, ear pain, mouth sores, nosebleeds, postnasal drip, rhinorrhea, sinus pressure, sneezing and sore throat.    Eyes:  Negative for pain, discharge and visual disturbance.   Respiratory:  Positive for shortness of breath. Negative for cough, chest tightness and wheezing.    Cardiovascular:  Positive for chest pain. Negative for palpitations and leg swelling.   Gastrointestinal:  Negative for abdominal pain, constipation, diarrhea, nausea and vomiting.   Endocrine: Negative for cold intolerance, heat intolerance, polydipsia, polyphagia and polyuria.   Musculoskeletal:  Negative for arthralgias, myalgias and neck pain.   Skin:  Negative for rash and wound.   Neurological:  Negative for dizziness, syncope, speech difficulty, weakness, light-headedness and headaches.   Hematological:  Negative for adenopathy. Does not bruise/bleed easily.   Psychiatric/Behavioral:  Negative for confusion, dysphoric mood and sleep disturbance. The patient is not nervous/anxious.    All other systems reviewed and are negative.    /68 (BP Location: Left arm, Patient Position: Sitting,  "Cuff Size: Adult)   Pulse 74   Ht 163.8 cm (64.5\")   Wt 83.5 kg (184 lb)   SpO2 95%   BMI 31.10 kg/mý     Objective   Allergies   Allergen Reactions    Bee Venom Hives       Physical Exam  Vitals reviewed.   Constitutional:       Appearance: Normal appearance. She is well-developed and overweight.   HENT:      Head: Normocephalic.   Eyes:      Conjunctiva/sclera: Conjunctivae normal.   Neck:      Thyroid: No thyromegaly.      Vascular: No carotid bruit or JVD.   Cardiovascular:      Rate and Rhythm: Normal rate and regular rhythm.   Pulmonary:      Effort: Pulmonary effort is normal.      Breath sounds: Normal breath sounds.   Musculoskeletal:      Cervical back: Neck supple.      Right lower leg: No edema.      Left lower leg: No edema.   Skin:     General: Skin is warm and dry.   Neurological:      Mental Status: She is alert and oriented to person, place, and time.   Psychiatric:         Attention and Perception: Attention normal.         Mood and Affect: Mood normal.         Speech: Speech normal.         Behavior: Behavior normal. Behavior is cooperative.         Cognition and Memory: Cognition normal.         ECG 12 Lead    Date/Time: 8/22/2023 11:11 AM  Performed by: Rhonda Rivers APRN  Authorized by: Rhonda Rivers APRN   Comparison: compared with previous ECG   Similar to previous ECG  Rhythm: sinus rhythm and sinus arrhythmia  Rate: normal  BPM: 74  Conduction: left bundle branch block    Clinical impression: abnormal EKG  Comments: QT/QTc - 411/439        LABS  WBC   Date Value Ref Range Status   12/23/2019 5.89 3.40 - 10.80 10*3/mm3 Final     RBC   Date Value Ref Range Status   12/23/2019 4.13 3.77 - 5.28 10*6/mm3 Final     Hemoglobin   Date Value Ref Range Status   12/23/2019 10.1 (L) 12.0 - 15.9 g/dL Final     Hematocrit   Date Value Ref Range Status   12/23/2019 34.5 34.0 - 46.6 % Final     MCV   Date Value Ref Range Status   12/23/2019 83.5 79.0 - 97.0 fL Final     MCH   Date Value Ref " Range Status   12/23/2019 24.5 (L) 26.6 - 33.0 pg Final     MCHC   Date Value Ref Range Status   12/23/2019 29.3 (L) 31.5 - 35.7 g/dL Final     RDW   Date Value Ref Range Status   12/23/2019 15.9 (H) 12.3 - 15.4 % Final     RDW-SD   Date Value Ref Range Status   12/23/2019 48.6 37.0 - 54.0 fl Final     MPV   Date Value Ref Range Status   12/23/2019 9.2 6.0 - 12.0 fL Final     Platelets   Date Value Ref Range Status   12/23/2019 188 140 - 450 10*3/mm3 Final     Neutrophil %   Date Value Ref Range Status   12/23/2019 56.5 42.7 - 76.0 % Final     Lymphocyte %   Date Value Ref Range Status   12/23/2019 33.4 19.6 - 45.3 % Final     Monocyte %   Date Value Ref Range Status   12/23/2019 6.3 5.0 - 12.0 % Final     Eosinophil %   Date Value Ref Range Status   12/23/2019 2.4 0.3 - 6.2 % Final     Basophil %   Date Value Ref Range Status   12/23/2019 1.2 0.0 - 1.5 % Final     Immature Grans %   Date Value Ref Range Status   12/23/2019 0.2 0.0 - 0.5 % Final     Neutrophils, Absolute   Date Value Ref Range Status   12/23/2019 3.33 1.70 - 7.00 10*3/mm3 Final     Lymphocytes, Absolute   Date Value Ref Range Status   12/23/2019 1.97 0.70 - 3.10 10*3/mm3 Final     Monocytes, Absolute   Date Value Ref Range Status   12/23/2019 0.37 0.10 - 0.90 10*3/mm3 Final     Eosinophils, Absolute   Date Value Ref Range Status   12/23/2019 0.14 0.00 - 0.40 10*3/mm3 Final     Basophils, Absolute   Date Value Ref Range Status   12/23/2019 0.07 0.00 - 0.20 10*3/mm3 Final     Immature Grans, Absolute   Date Value Ref Range Status   12/23/2019 0.01 0.00 - 0.05 10*3/mm3 Final     nRBC   Date Value Ref Range Status   12/23/2019 0.0 0.0 - 0.2 /100 WBC Final     No results found for: CHOL, TRIG, HDL, LDL    IMAGING  X-ray chest PA and lateral    Result Date: 8/3/2023  No acute cardiopulmonary process. Images reviewed, interpreted, and dictated by Dr. CHEY Delgado. Transcribed by UCHE Jacobsen (R).    Nuclear Stress Test  8/15/2023  Interpretation Summary       Left ventricular ejection fraction is borderline normal (Calculated EF = 50%).    Impressions are consistent with an intermediate risk study.    GI artifact is present.    There is no prior study available for comparison.    Findings consistent with an indeterminate ECG stress test.    This was a technically suboptimal study also including significant GI attenuation, the study showed fixed apical, apical septal and apical inferior wall defect consistent with infarction of the above area with reversible mid to distal inferolateral wall defect consistent with ischemia at the above wall, TID 1.06.     Echocardiogram 8/15/2023  Interpretation Summary       Left ventricular systolic function is normal. Left ventricular ejection fraction appears to be 56 - 60%.    Left ventricular diastolic function is consistent with (grade I) impaired relaxation.    The left atrial cavity is mildly dilated.    Assessment & Plan   Diagnoses and all orders for this visit:    1. Abnormal nuclear stress test (Primary)  -     Case Request Cath Lab: Left Heart Cath  -     Basic Metabolic Panel; Future  -     CBC & Differential; Future  Discussed and reviewed nuclear stress test  Discussed treatment options however due to the need for preop clearance, intermittent symptoms, abnormal testing and strong family history cardiac cath is recommended.  Have discussed risk and benefits of cardiac cath including but not limited to hematoma, arrhythmia, kidney injury, MI, CVA and death.  She is agreeable to proceed.    2. Chest pain, unspecified type  Continue on aspirin statin and beta-blocker, further evaluation recommended    3. Multiple risk factors for coronary artery disease  -     Case Request Cath Lab: Left Heart Cath  Recommend ischemic evaluation    4. Preoperative cardiovascular examination  -     ECG 12 Lead  EKG reviewed and discussed, LBBB appears chronic since 2018  Await cardiac cath     5. Type 2  diabetes mellitus without complication, without long-term current use of insulin  Continue to follow with primary for management, tight glycemic control for overall health benefit    6. Electronic cigarette use  Recommend avoidance of vape/e-cig    Discussed and reviewed recent cardiovascular testing    Follow-up in 1 month, sooner if needed

## 2023-08-22 NOTE — LETTER
"August 23, 2023     Debbie Calvo MD  [address] [facility] [address]  [facility] [address]    Patient: Divine Stewart   YOB: 1955   Date of Visit: 8/22/2023       Dear Debbie Calvo MD    Divine Stewart was in my office today. Below is a copy of my note.    If you have questions, please do not hesitate to call me. I look forward to following Divine along with you.         Sincerely,        KELLY Ko        CC: No Recipients    Subjective    Divine Stewart is a 67 y.o. female.   Chief Complaint   Patient presents with    [facility]     Abnormal ECG and Echo     History of Present Illness   Divine Stewart is a 67-year-old female who presents to clinic today as a new patient for cardiology evaluation.  She is accompanied by her sister, Liliana.     She was referred for cardiac evaluation.  She was initially seen for preoperative clearance and reports she had an EKG at [facility].  She was planning to have a prolapsed bladder repair and bowel/hemorrhoid surgery. No cardiac clearance form sent from surgeon.  She was subsequently referred to [facility] and seen by Nilesh Eller who had ordered a nuclear stress test and echocardiogram.  She presents to [facility] as a new patient today to discuss results.  She reports she was advised she would likely need cardiac cath prior to surgery clearance.  She does report previous cardiac testing and after testing was placed on Imdur and Toprol.  No results are available for review of prior cardiac testing.  She does have a chronic left bundle branch block EKG pattern since 2018.    She does report intermittent chest discomfort that radiates up into her neck and she can drink something and belch which improves symptoms.  She feels it is primarily related to \"gas.\"  Cardiac risk factors are HTN currently on Losartan and Toprol.  Dyslipidemia currently on Crestor.  Most recent lipid from August with LDL goal " less than 70.  Underlying DM managed by PCP with a recent hemoglobin A1c of 8.4.  Family history of CABG in 2 siblings, father with MI and bother with MI and stents.     Patient Active Problem List   Diagnosis    Pancytopenia     Past Medical History:   Diagnosis Date    Arthritis     Depression     Diabetes mellitus     Hyperlipidemia     Hypertension     Incontinence     B&B    Infectious viral hepatitis     Kidney stone      Past Surgical History:   Procedure Laterality Date    APPENDECTOMY      BREAST BIOPSY Right     benign    CHOLECYSTECTOMY      EYE SURGERY      HEMORRHOIDECTOMY      SPINE SURGERY      lumbar    TEMPOROMANDIBULAR JOINT SURGERY      TUBAL ABDOMINAL LIGATION         Family History   Problem Relation Age of Onset    Diabetes Mother     Heart disease Father     No Known Problems Maternal Grandmother     No Known Problems Maternal Grandfather     No Known Problems Paternal Grandmother     No Known Problems Paternal Grandfather     Breast cancer Neg Hx      Social History     Tobacco Use    Smoking status: Former     Packs/day: 1.00     Years: 25.00     Pack years: 25.00     Types: Cigarettes     Quit date: 2000     Years since quittin.6     Passive exposure: Past    Smokeless tobacco: Never   Vaping Use    Vaping Use: Never used   Substance Use Topics    Alcohol use: No    Drug use: No         The following portions of the patient's history were reviewed and updated as appropriate: allergies, current medications, past family history, past medical history, past social history, past surgical history and problem list.    Allergies   Allergen Reactions    Bee Venom Hives         Current Outpatient Medications:     Insulin Lispro, 1 Unit Dial, (HUMALOG) 100 UNIT/ML solution pen-injector, Inject  under the skin into the appropriate area as directed 3 (Three) Times a Day., Disp: , Rfl:     isosorbide mononitrate (IMDUR) 30 MG 24 hr tablet, Take 1 tablet by mouth  Daily., Disp: , Rfl:     Lantus SoloStar 100 UNIT/ML injection pen, Inject 30 Units under the skin into the appropriate area as directed Daily., Disp: , Rfl:     losartan (COZAAR) 25 MG tablet, Take 1 tablet by mouth Daily. for blood pressure, Disp: , Rfl:     metFORMIN (GLUCOPHAGE) 1000 MG tablet, Take 1 tablet by mouth 2 (Two) Times a Day With Meals., Disp: , Rfl:     metoprolol succinate XL (TOPROL-XL) 25 MG 24 hr tablet, Take 1 tablet by mouth Daily., Disp: , Rfl:     oxyCODONE-acetaminophen (PERCOCET) 7.5-325 MG per tablet, Take 1 tablet by mouth Every 6 (Six) Hours As Needed., Disp: , Rfl:     pantoprazole (PROTONIX) 40 MG EC tablet, Take 1 tablet by mouth Daily., Disp: , Rfl:     pramipexole (MIRAPEX) 0.25 MG tablet, Take 1 tablet by mouth Every Night., Disp: , Rfl:     pregabalin (LYRICA) 150 MG capsule, Take 1 capsule by mouth Every Night., Disp: , Rfl:     rosuvastatin (CRESTOR) 20 MG tablet, Take 1 tablet by mouth Every Night., Disp: , Rfl:     venlafaxine (EFFEXOR) 75 MG tablet, Take 1 tablet by mouth 2 (Two) Times a Day., Disp: , Rfl:     aspirin 81 MG EC tablet, Take 1 tablet by mouth Daily. (Patient not taking: Reported on 8/22/2023), Disp: , Rfl:     Review of Systems   Constitutional:  Negative for activity change, appetite change, chills, diaphoresis, fatigue and fever.   HENT:  Negative for congestion, drooling, ear discharge, ear pain, mouth sores, nosebleeds, postnasal drip, rhinorrhea, sinus pressure, sneezing and sore throat.    Eyes:  Negative for pain, discharge and visual disturbance.   Respiratory:  Positive for shortness of breath. Negative for cough, chest tightness and wheezing.    Cardiovascular:  Positive for chest pain. Negative for palpitations and leg swelling.   Gastrointestinal:  Negative for abdominal pain, constipation, diarrhea, nausea and vomiting.   Endocrine: Negative for cold intolerance, heat intolerance, polydipsia, polyphagia and polyuria.   Musculoskeletal:   "Negative for arthralgias, myalgias and neck pain.   Skin:  Negative for rash and wound.   Neurological:  Negative for dizziness, syncope, speech difficulty, weakness, light-headedness and headaches.   Hematological:  Negative for adenopathy. Does not bruise/bleed easily.   Psychiatric/Behavioral:  Negative for confusion, dysphoric mood and sleep disturbance. The patient is not nervous/anxious.    All other systems reviewed and are negative.    /68 (BP Location: Left arm, Patient Position: Sitting, Cuff Size: Adult)   Pulse 74   Ht 163.8 cm (64.5\")   Wt 83.5 kg (184 lb)   SpO2 95%   BMI 31.10 kg/mý     Objective  Allergies   Allergen Reactions    Bee Venom Hives       Physical Exam  Vitals reviewed.   Constitutional:       Appearance: Normal appearance. She is well-developed and overweight.   HENT:      Head: Normocephalic.   Eyes:      Conjunctiva/sclera: Conjunctivae normal.   Neck:      Thyroid: No thyromegaly.      Vascular: No carotid bruit or JVD.   Cardiovascular:      Rate and Rhythm: Normal rate and regular rhythm.   Pulmonary:      Effort: Pulmonary effort is normal.      Breath sounds: Normal breath sounds.   Musculoskeletal:      Cervical back: Neck supple.      Right lower leg: No edema.      Left lower leg: No edema.   Skin:     General: Skin is warm and dry.   Neurological:      Mental Status: She is alert and oriented to person, place, and time.   Psychiatric:         Attention and Perception: Attention normal.         Mood and Affect: Mood normal.         Speech: Speech normal.         Behavior: Behavior normal. Behavior is cooperative.         Cognition and Memory: Cognition normal.         ECG 12 Lead    Date/Time: 8/22/2023 11:11 AM  Performed by: Rhonda Rivers APRN  Authorized by: Rhonda Rivers APRN   Comparison: compared with previous ECG   Similar to previous ECG  Rhythm: sinus rhythm and sinus arrhythmia  Rate: normal  BPM: 74  Conduction: left bundle branch " block    Clinical impression: abnormal EKG  Comments: QT/QTc - 411/439        LABS  WBC   Date Value Ref Range Status   12/23/2019 5.89 3.40 - 10.80 10*3/mm3 Final     RBC   Date Value Ref Range Status   12/23/2019 4.13 3.77 - 5.28 10*6/mm3 Final     Hemoglobin   Date Value Ref Range Status   12/23/2019 10.1 (L) 12.0 - 15.9 g/dL Final     Hematocrit   Date Value Ref Range Status   12/23/2019 34.5 34.0 - 46.6 % Final     MCV   Date Value Ref Range Status   12/23/2019 83.5 79.0 - 97.0 fL Final     MCH   Date Value Ref Range Status   12/23/2019 24.5 (L) 26.6 - 33.0 pg Final     MCHC   Date Value Ref Range Status   12/23/2019 29.3 (L) 31.5 - 35.7 g/dL Final     RDW   Date Value Ref Range Status   12/23/2019 15.9 (H) 12.3 - 15.4 % Final     RDW-SD   Date Value Ref Range Status   12/23/2019 48.6 37.0 - 54.0 fl Final     MPV   Date Value Ref Range Status   12/23/2019 9.2 6.0 - 12.0 fL Final     Platelets   Date Value Ref Range Status   12/23/2019 188 140 - 450 10*3/mm3 Final     Neutrophil %   Date Value Ref Range Status   12/23/2019 56.5 42.7 - 76.0 % Final     Lymphocyte %   Date Value Ref Range Status   12/23/2019 33.4 19.6 - 45.3 % Final     Monocyte %   Date Value Ref Range Status   12/23/2019 6.3 5.0 - 12.0 % Final     Eosinophil %   Date Value Ref Range Status   12/23/2019 2.4 0.3 - 6.2 % Final     Basophil %   Date Value Ref Range Status   12/23/2019 1.2 0.0 - 1.5 % Final     Immature Grans %   Date Value Ref Range Status   12/23/2019 0.2 0.0 - 0.5 % Final     Neutrophils, Absolute   Date Value Ref Range Status   12/23/2019 3.33 1.70 - 7.00 10*3/mm3 Final     Lymphocytes, Absolute   Date Value Ref Range Status   12/23/2019 1.97 0.70 - 3.10 10*3/mm3 Final     Monocytes, Absolute   Date Value Ref Range Status   12/23/2019 0.37 0.10 - 0.90 10*3/mm3 Final     Eosinophils, Absolute   Date Value Ref Range Status   12/23/2019 0.14 0.00 - 0.40 10*3/mm3 Final     Basophils, Absolute   Date Value Ref Range Status    12/23/2019 0.07 0.00 - 0.20 10*3/mm3 Final     Immature Grans, Absolute   Date Value Ref Range Status   12/23/2019 0.01 0.00 - 0.05 10*3/mm3 Final     nRBC   Date Value Ref Range Status   12/23/2019 0.0 0.0 - 0.2 /100 WBC Final     No results found for: CHOL, TRIG, HDL, LDL    IMAGING  X-ray chest PA and lateral    Result Date: 8/3/2023  No acute cardiopulmonary process. Images reviewed, interpreted, and dictated by Dr. CHEY Delgado. Transcribed by UCHE Jacobsen (R).    Nuclear Stress Test 8/15/2023  Interpretation Summary       Left ventricular ejection fraction is borderline normal (Calculated EF = 50%).    Impressions are consistent with an intermediate risk study.    GI artifact is present.    There is no prior study available for comparison.    Findings consistent with an indeterminate ECG stress test.    This was a technically suboptimal study also including significant GI attenuation, the study showed fixed apical, apical septal and apical inferior wall defect consistent with infarction of the above area with reversible mid to distal inferolateral wall defect consistent with ischemia at the above wall, TID 1.06.     Echocardiogram 8/15/2023  Interpretation Summary       Left ventricular systolic function is normal. Left ventricular ejection fraction appears to be 56 - 60%.    Left ventricular diastolic function is consistent with (grade I) impaired relaxation.    The left atrial cavity is mildly dilated.    Assessment & Plan  Diagnoses and all orders for this visit:    1. Abnormal nuclear stress test (Primary)  -     Case Request Cath Lab: Left Heart Cath  -     Basic Metabolic Panel; Future  -     CBC & Differential; Future  Discussed and reviewed nuclear stress test  Discussed treatment options however due to the need for preop clearance, intermittent symptoms, abnormal testing and strong family history cardiac cath is recommended.  Have discussed risk and benefits of cardiac cath  including but not limited to hematoma, arrhythmia, kidney injury, MI, CVA and death.  She is agreeable to proceed.    2. Chest pain, unspecified type  Continue on aspirin statin and beta-blocker, further evaluation recommended    3. Multiple risk factors for coronary artery disease  -     Case Request Cath Lab: Left Heart Cath  Recommend ischemic evaluation    4. Preoperative cardiovascular examination  -     ECG 12 Lead  EKG reviewed and discussed, LBBB appears chronic since 2018  Await cardiac cath     5. Type 2 diabetes mellitus without complication, without long-term current use of insulin  Continue to follow with primary for management, tight glycemic control for overall health benefit    6. Electronic cigarette use  Recommend avoidance of vape/e-cig    Discussed and reviewed recent cardiovascular testing    Follow-up in 1 month, sooner if needed

## 2023-08-22 NOTE — H&P (VIEW-ONLY)
"Subjective     Divine Stewart is a 67 y.o. female.   Chief Complaint   Patient presents with    [facility]     Abnormal ECG and Echo     History of Present Illness   Divine Stewart is a 67-year-old female who presents to clinic today as a new patient for cardiology evaluation.  She is accompanied by her sister, Liliana.     She was referred for cardiac evaluation.  She was initially seen for preoperative clearance and reports she had an EKG at [facility].  She was planning to have a prolapsed bladder repair and bowel/hemorrhoid surgery. No cardiac clearance form sent from surgeon.  She was subsequently referred to [facility] and seen by Nilesh Eller who had ordered a nuclear stress test and echocardiogram.  She presents to [facility] as a new patient today to discuss results.  She reports she was advised she would likely need cardiac cath prior to surgery clearance.  She does report previous cardiac testing and after testing was placed on Imdur and Toprol.  No results are available for review of prior cardiac testing.  She does have a chronic left bundle branch block EKG pattern since 2018.    She does report intermittent chest discomfort that radiates up into her neck and she can drink something and belch which improves symptoms.  She feels it is primarily related to \"gas.\"  Cardiac risk factors are HTN currently on Losartan and Toprol.  Dyslipidemia currently on Crestor.  Most recent lipid from August with LDL goal less than 70.  Underlying DM managed by PCP with a recent hemoglobin A1c of 8.4.  Family history of CABG in 2 siblings, father with MI and bother with MI and stents.     Patient Active Problem List   Diagnosis    Pancytopenia     Past Medical History:   Diagnosis Date    Arthritis     Depression     Diabetes mellitus     Hyperlipidemia     Hypertension     Incontinence     B&B    Infectious viral hepatitis     Kidney stone      Past Surgical History:   Procedure " Laterality Date    APPENDECTOMY      BREAST BIOPSY Right     benign    CHOLECYSTECTOMY      EYE SURGERY      HEMORRHOIDECTOMY      SPINE SURGERY      lumbar    TEMPOROMANDIBULAR JOINT SURGERY      TUBAL ABDOMINAL LIGATION         Family History   Problem Relation Age of Onset    Diabetes Mother     Heart disease Father     No Known Problems Maternal Grandmother     No Known Problems Maternal Grandfather     No Known Problems Paternal Grandmother     No Known Problems Paternal Grandfather     Breast cancer Neg Hx      Social History     Tobacco Use    Smoking status: Former     Packs/day: 1.00     Years: 25.00     Pack years: 25.00     Types: Cigarettes     Quit date: 2000     Years since quittin.6     Passive exposure: Past    Smokeless tobacco: Never   Vaping Use    Vaping Use: Never used   Substance Use Topics    Alcohol use: No    Drug use: No         The following portions of the patient's history were reviewed and updated as appropriate: allergies, current medications, past family history, past medical history, past social history, past surgical history and problem list.    Allergies   Allergen Reactions    Bee Venom Hives         Current Outpatient Medications:     Insulin Lispro, 1 Unit Dial, (HUMALOG) 100 UNIT/ML solution pen-injector, Inject  under the skin into the appropriate area as directed 3 (Three) Times a Day., Disp: , Rfl:     isosorbide mononitrate (IMDUR) 30 MG 24 hr tablet, Take 1 tablet by mouth Daily., Disp: , Rfl:     Lantus SoloStar 100 UNIT/ML injection pen, Inject 30 Units under the skin into the appropriate area as directed Daily., Disp: , Rfl:     losartan (COZAAR) 25 MG tablet, Take 1 tablet by mouth Daily. for blood pressure, Disp: , Rfl:     metFORMIN (GLUCOPHAGE) 1000 MG tablet, Take 1 tablet by mouth 2 (Two) Times a Day With Meals., Disp: , Rfl:     metoprolol succinate XL (TOPROL-XL) 25 MG 24 hr tablet, Take 1 tablet by mouth Daily., Disp: , Rfl:     oxyCODONE-acetaminophen  (PERCOCET) 7.5-325 MG per tablet, Take 1 tablet by mouth Every 6 (Six) Hours As Needed., Disp: , Rfl:     pantoprazole (PROTONIX) 40 MG EC tablet, Take 1 tablet by mouth Daily., Disp: , Rfl:     pramipexole (MIRAPEX) 0.25 MG tablet, Take 1 tablet by mouth Every Night., Disp: , Rfl:     pregabalin (LYRICA) 150 MG capsule, Take 1 capsule by mouth Every Night., Disp: , Rfl:     rosuvastatin (CRESTOR) 20 MG tablet, Take 1 tablet by mouth Every Night., Disp: , Rfl:     venlafaxine (EFFEXOR) 75 MG tablet, Take 1 tablet by mouth 2 (Two) Times a Day., Disp: , Rfl:     aspirin 81 MG EC tablet, Take 1 tablet by mouth Daily. (Patient not taking: Reported on 8/22/2023), Disp: , Rfl:     Review of Systems   Constitutional:  Negative for activity change, appetite change, chills, diaphoresis, fatigue and fever.   HENT:  Negative for congestion, drooling, ear discharge, ear pain, mouth sores, nosebleeds, postnasal drip, rhinorrhea, sinus pressure, sneezing and sore throat.    Eyes:  Negative for pain, discharge and visual disturbance.   Respiratory:  Positive for shortness of breath. Negative for cough, chest tightness and wheezing.    Cardiovascular:  Positive for chest pain. Negative for palpitations and leg swelling.   Gastrointestinal:  Negative for abdominal pain, constipation, diarrhea, nausea and vomiting.   Endocrine: Negative for cold intolerance, heat intolerance, polydipsia, polyphagia and polyuria.   Musculoskeletal:  Negative for arthralgias, myalgias and neck pain.   Skin:  Negative for rash and wound.   Neurological:  Negative for dizziness, syncope, speech difficulty, weakness, light-headedness and headaches.   Hematological:  Negative for adenopathy. Does not bruise/bleed easily.   Psychiatric/Behavioral:  Negative for confusion, dysphoric mood and sleep disturbance. The patient is not nervous/anxious.    All other systems reviewed and are negative.    /68 (BP Location: Left arm, Patient Position: Sitting,  "Cuff Size: Adult)   Pulse 74   Ht 163.8 cm (64.5\")   Wt 83.5 kg (184 lb)   SpO2 95%   BMI 31.10 kg/m²     Objective   Allergies   Allergen Reactions    Bee Venom Hives       Physical Exam  Vitals reviewed.   Constitutional:       Appearance: Normal appearance. She is well-developed and overweight.   HENT:      Head: Normocephalic.   Eyes:      Conjunctiva/sclera: Conjunctivae normal.   Neck:      Thyroid: No thyromegaly.      Vascular: No carotid bruit or JVD.   Cardiovascular:      Rate and Rhythm: Normal rate and regular rhythm.   Pulmonary:      Effort: Pulmonary effort is normal.      Breath sounds: Normal breath sounds.   Musculoskeletal:      Cervical back: Neck supple.      Right lower leg: No edema.      Left lower leg: No edema.   Skin:     General: Skin is warm and dry.   Neurological:      Mental Status: She is alert and oriented to person, place, and time.   Psychiatric:         Attention and Perception: Attention normal.         Mood and Affect: Mood normal.         Speech: Speech normal.         Behavior: Behavior normal. Behavior is cooperative.         Cognition and Memory: Cognition normal.         ECG 12 Lead    Date/Time: 8/22/2023 11:11 AM  Performed by: Rhonda Rivers APRN  Authorized by: Rhonda Rivers APRN   Comparison: compared with previous ECG   Similar to previous ECG  Rhythm: sinus rhythm and sinus arrhythmia  Rate: normal  BPM: 74  Conduction: left bundle branch block    Clinical impression: abnormal EKG  Comments: QT/QTc - 411/439        LABS  WBC   Date Value Ref Range Status   12/23/2019 5.89 3.40 - 10.80 10*3/mm3 Final     RBC   Date Value Ref Range Status   12/23/2019 4.13 3.77 - 5.28 10*6/mm3 Final     Hemoglobin   Date Value Ref Range Status   12/23/2019 10.1 (L) 12.0 - 15.9 g/dL Final     Hematocrit   Date Value Ref Range Status   12/23/2019 34.5 34.0 - 46.6 % Final     MCV   Date Value Ref Range Status   12/23/2019 83.5 79.0 - 97.0 fL Final     MCH   Date Value Ref " Range Status   12/23/2019 24.5 (L) 26.6 - 33.0 pg Final     MCHC   Date Value Ref Range Status   12/23/2019 29.3 (L) 31.5 - 35.7 g/dL Final     RDW   Date Value Ref Range Status   12/23/2019 15.9 (H) 12.3 - 15.4 % Final     RDW-SD   Date Value Ref Range Status   12/23/2019 48.6 37.0 - 54.0 fl Final     MPV   Date Value Ref Range Status   12/23/2019 9.2 6.0 - 12.0 fL Final     Platelets   Date Value Ref Range Status   12/23/2019 188 140 - 450 10*3/mm3 Final     Neutrophil %   Date Value Ref Range Status   12/23/2019 56.5 42.7 - 76.0 % Final     Lymphocyte %   Date Value Ref Range Status   12/23/2019 33.4 19.6 - 45.3 % Final     Monocyte %   Date Value Ref Range Status   12/23/2019 6.3 5.0 - 12.0 % Final     Eosinophil %   Date Value Ref Range Status   12/23/2019 2.4 0.3 - 6.2 % Final     Basophil %   Date Value Ref Range Status   12/23/2019 1.2 0.0 - 1.5 % Final     Immature Grans %   Date Value Ref Range Status   12/23/2019 0.2 0.0 - 0.5 % Final     Neutrophils, Absolute   Date Value Ref Range Status   12/23/2019 3.33 1.70 - 7.00 10*3/mm3 Final     Lymphocytes, Absolute   Date Value Ref Range Status   12/23/2019 1.97 0.70 - 3.10 10*3/mm3 Final     Monocytes, Absolute   Date Value Ref Range Status   12/23/2019 0.37 0.10 - 0.90 10*3/mm3 Final     Eosinophils, Absolute   Date Value Ref Range Status   12/23/2019 0.14 0.00 - 0.40 10*3/mm3 Final     Basophils, Absolute   Date Value Ref Range Status   12/23/2019 0.07 0.00 - 0.20 10*3/mm3 Final     Immature Grans, Absolute   Date Value Ref Range Status   12/23/2019 0.01 0.00 - 0.05 10*3/mm3 Final     nRBC   Date Value Ref Range Status   12/23/2019 0.0 0.0 - 0.2 /100 WBC Final     No results found for: CHOL, TRIG, HDL, LDL    IMAGING  X-ray chest PA and lateral    Result Date: 8/3/2023  No acute cardiopulmonary process. Images reviewed, interpreted, and dictated by Dr. CHEY Delgado. Transcribed by UCHE Jacobsen (R).    Nuclear Stress Test  8/15/2023  Interpretation Summary       Left ventricular ejection fraction is borderline normal (Calculated EF = 50%).    Impressions are consistent with an intermediate risk study.    GI artifact is present.    There is no prior study available for comparison.    Findings consistent with an indeterminate ECG stress test.    This was a technically suboptimal study also including significant GI attenuation, the study showed fixed apical, apical septal and apical inferior wall defect consistent with infarction of the above area with reversible mid to distal inferolateral wall defect consistent with ischemia at the above wall, TID 1.06.     Echocardiogram 8/15/2023  Interpretation Summary       Left ventricular systolic function is normal. Left ventricular ejection fraction appears to be 56 - 60%.    Left ventricular diastolic function is consistent with (grade I) impaired relaxation.    The left atrial cavity is mildly dilated.    Assessment & Plan   Diagnoses and all orders for this visit:    1. Abnormal nuclear stress test (Primary)  -     Case Request Cath Lab: Left Heart Cath  -     Basic Metabolic Panel; Future  -     CBC & Differential; Future  Discussed and reviewed nuclear stress test  Discussed treatment options however due to the need for preop clearance, intermittent symptoms, abnormal testing and strong family history cardiac cath is recommended.  Have discussed risk and benefits of cardiac cath including but not limited to hematoma, arrhythmia, kidney injury, MI, CVA and death.  She is agreeable to proceed.    2. Chest pain, unspecified type  Continue on aspirin statin and beta-blocker, further evaluation recommended    3. Multiple risk factors for coronary artery disease  -     Case Request Cath Lab: Left Heart Cath  Recommend ischemic evaluation    4. Preoperative cardiovascular examination  -     ECG 12 Lead  EKG reviewed and discussed, LBBB appears chronic since 2018  Await cardiac cath     5. Type 2  diabetes mellitus without complication, without long-term current use of insulin  Continue to follow with primary for management, tight glycemic control for overall health benefit    6. Electronic cigarette use  Recommend avoidance of vape/e-cig    Discussed and reviewed recent cardiovascular testing    Follow-up in 1 month, sooner if needed

## 2023-08-23 PROBLEM — I44.7 LBBB (LEFT BUNDLE BRANCH BLOCK): Chronic | Status: ACTIVE | Noted: 2023-08-23

## 2023-08-23 PROBLEM — I44.7 LBBB (LEFT BUNDLE BRANCH BLOCK): Status: ACTIVE | Noted: 2023-08-23

## 2023-08-31 PROBLEM — Z91.89 MULTIPLE RISK FACTORS FOR CORONARY ARTERY DISEASE: Status: ACTIVE | Noted: 2023-08-31

## 2023-08-31 PROBLEM — R94.39 ABNORMAL NUCLEAR STRESS TEST: Status: ACTIVE | Noted: 2023-08-31

## 2023-09-08 ENCOUNTER — HOSPITAL ENCOUNTER (OUTPATIENT)
Facility: HOSPITAL | Age: 68
Discharge: HOME | End: 2023-09-08
Attending: INTERNAL MEDICINE | Admitting: INTERNAL MEDICINE
Payer: MEDICARE

## 2023-09-08 VITALS
OXYGEN SATURATION: 97 % | HEIGHT: 64 IN | TEMPERATURE: 98.2 F | BODY MASS INDEX: 32.15 KG/M2 | WEIGHT: 188.3 LBS | HEART RATE: 76 BPM | RESPIRATION RATE: 18 BRPM | DIASTOLIC BLOOD PRESSURE: 75 MMHG | SYSTOLIC BLOOD PRESSURE: 161 MMHG

## 2023-09-08 DIAGNOSIS — Z91.89 MULTIPLE RISK FACTORS FOR CORONARY ARTERY DISEASE: ICD-10-CM

## 2023-09-08 DIAGNOSIS — R94.39 ABNORMAL NUCLEAR STRESS TEST: ICD-10-CM

## 2023-09-08 PROBLEM — I25.10 CAD (CORONARY ARTERY DISEASE): Status: ACTIVE | Noted: 2023-09-08

## 2023-09-08 LAB
ANION GAP SERPL CALCULATED.3IONS-SCNC: 12.1 MMOL/L (ref 5–15)
BASOPHILS # BLD AUTO: 0.06 10*3/MM3 (ref 0–0.2)
BASOPHILS NFR BLD AUTO: 1.1 % (ref 0–1.5)
BUN SERPL-MCNC: 15 MG/DL (ref 8–23)
BUN/CREAT SERPL: 14.6 (ref 7–25)
CALCIUM SPEC-SCNC: 9.3 MG/DL (ref 8.6–10.5)
CHLORIDE SERPL-SCNC: 105 MMOL/L (ref 98–107)
CO2 SERPL-SCNC: 24.9 MMOL/L (ref 22–29)
CREAT SERPL-MCNC: 1.03 MG/DL (ref 0.57–1)
DEPRECATED RDW RBC AUTO: 44.7 FL (ref 37–54)
EGFRCR SERPLBLD CKD-EPI 2021: 59.7 ML/MIN/1.73
EOSINOPHIL # BLD AUTO: 0.24 10*3/MM3 (ref 0–0.4)
EOSINOPHIL NFR BLD AUTO: 4.2 % (ref 0.3–6.2)
ERYTHROCYTE [DISTWIDTH] IN BLOOD BY AUTOMATED COUNT: 14.4 % (ref 12.3–15.4)
GLUCOSE SERPL-MCNC: 150 MG/DL (ref 65–99)
HCT VFR BLD AUTO: 33.1 % (ref 34–46.6)
HGB BLD-MCNC: 10.1 G/DL (ref 12–15.9)
IMM GRANULOCYTES # BLD AUTO: 0.01 10*3/MM3 (ref 0–0.05)
IMM GRANULOCYTES NFR BLD AUTO: 0.2 % (ref 0–0.5)
LYMPHOCYTES # BLD AUTO: 2.22 10*3/MM3 (ref 0.7–3.1)
LYMPHOCYTES NFR BLD AUTO: 38.9 % (ref 19.6–45.3)
MCH RBC QN AUTO: 26 PG (ref 26.6–33)
MCHC RBC AUTO-ENTMCNC: 30.5 G/DL (ref 31.5–35.7)
MCV RBC AUTO: 85.1 FL (ref 79–97)
MONOCYTES # BLD AUTO: 0.3 10*3/MM3 (ref 0.1–0.9)
MONOCYTES NFR BLD AUTO: 5.3 % (ref 5–12)
NEUTROPHILS NFR BLD AUTO: 2.87 10*3/MM3 (ref 1.7–7)
NEUTROPHILS NFR BLD AUTO: 50.3 % (ref 42.7–76)
NRBC BLD AUTO-RTO: 0 /100 WBC (ref 0–0.2)
PLATELET # BLD AUTO: 170 10*3/MM3 (ref 140–450)
PMV BLD AUTO: 9.6 FL (ref 6–12)
POTASSIUM SERPL-SCNC: 4.7 MMOL/L (ref 3.5–5.2)
QT INTERVAL: 420 MS
QTC INTERVAL: 466 MS
RBC # BLD AUTO: 3.89 10*6/MM3 (ref 3.77–5.28)
SODIUM SERPL-SCNC: 142 MMOL/L (ref 136–145)
WBC NRBC COR # BLD: 5.7 10*3/MM3 (ref 3.4–10.8)

## 2023-09-08 PROCEDURE — 85025 COMPLETE CBC W/AUTO DIFF WBC: CPT | Performed by: NURSE PRACTITIONER

## 2023-09-08 PROCEDURE — 93458 L HRT ARTERY/VENTRICLE ANGIO: CPT | Performed by: INTERNAL MEDICINE

## 2023-09-08 PROCEDURE — 93010 ELECTROCARDIOGRAM REPORT: CPT | Performed by: INTERNAL MEDICINE

## 2023-09-08 PROCEDURE — 94799 UNLISTED PULMONARY SVC/PX: CPT

## 2023-09-08 PROCEDURE — 25010000002 FENTANYL CITRATE (PF) 50 MCG/ML SOLUTION: Performed by: INTERNAL MEDICINE

## 2023-09-08 PROCEDURE — 94664 DEMO&/EVAL PT USE INHALER: CPT

## 2023-09-08 PROCEDURE — C1769 GUIDE WIRE: HCPCS | Performed by: INTERNAL MEDICINE

## 2023-09-08 PROCEDURE — 25010000002 HEPARIN (PORCINE) PER 1000 UNITS: Performed by: INTERNAL MEDICINE

## 2023-09-08 PROCEDURE — 93005 ELECTROCARDIOGRAM TRACING: CPT | Performed by: INTERNAL MEDICINE

## 2023-09-08 PROCEDURE — 25010000002 MIDAZOLAM PER 1 MG: Performed by: INTERNAL MEDICINE

## 2023-09-08 PROCEDURE — 80048 BASIC METABOLIC PNL TOTAL CA: CPT | Performed by: NURSE PRACTITIONER

## 2023-09-08 PROCEDURE — C1894 INTRO/SHEATH, NON-LASER: HCPCS | Performed by: INTERNAL MEDICINE

## 2023-09-08 PROCEDURE — 25510000001 IOPAMIDOL PER 1 ML: Performed by: INTERNAL MEDICINE

## 2023-09-08 RX ORDER — SODIUM CHLORIDE 9 MG/ML
INJECTION, SOLUTION INTRAVENOUS
Status: COMPLETED | OUTPATIENT
Start: 2023-09-08 | End: 2023-09-08

## 2023-09-08 RX ORDER — VERAPAMIL HYDROCHLORIDE 2.5 MG/ML
INJECTION, SOLUTION INTRAVENOUS
Status: DISCONTINUED | OUTPATIENT
Start: 2023-09-08 | End: 2023-09-08 | Stop reason: HOSPADM

## 2023-09-08 RX ORDER — MIDAZOLAM HYDROCHLORIDE 1 MG/ML
INJECTION INTRAMUSCULAR; INTRAVENOUS
Status: DISCONTINUED | OUTPATIENT
Start: 2023-09-08 | End: 2023-09-08 | Stop reason: HOSPADM

## 2023-09-08 RX ORDER — FENTANYL CITRATE 50 UG/ML
INJECTION, SOLUTION INTRAMUSCULAR; INTRAVENOUS
Status: DISCONTINUED | OUTPATIENT
Start: 2023-09-08 | End: 2023-09-08 | Stop reason: HOSPADM

## 2023-09-08 RX ORDER — HEPARIN SODIUM 1000 [USP'U]/ML
INJECTION, SOLUTION INTRAVENOUS; SUBCUTANEOUS
Status: DISCONTINUED | OUTPATIENT
Start: 2023-09-08 | End: 2023-09-08 | Stop reason: HOSPADM

## 2023-09-08 RX ORDER — LIDOCAINE HYDROCHLORIDE 20 MG/ML
INJECTION, SOLUTION INFILTRATION; PERINEURAL
Status: DISCONTINUED | OUTPATIENT
Start: 2023-09-08 | End: 2023-09-08 | Stop reason: HOSPADM

## 2023-09-08 RX ORDER — NITROGLYCERIN 0.4 MG/1
0.4 TABLET SUBLINGUAL
Status: DISCONTINUED | OUTPATIENT
Start: 2023-09-08 | End: 2023-09-08 | Stop reason: HOSPADM

## 2023-09-08 NOTE — NURSING NOTE
Cardiac Rehab referral received on patient. After reviewing patient information there is no qualifying diagnosis noted at this time .Qualifications for Cardiac Rehab include Coronary Stenting, AMI (NSTEMI Type 1, STEMI), Stable Angina, CABG, Heart Valve Repair/Replacement, Heart Transplant or Stable CHF (with these specific qualifications, Left Ventricular Ejection Fraction of 35% or less, NYHA class II to IV symptoms despite optimal heart failure therapy for at least 6 weeks and has not had a recent (less than or equal to 6 weeks) or planned (less than or equal to 6 months) major cardiovascular hospitalizations or procedures. Due to patient being in the hospital, does not meet criteria at this time) Please contact us at [phone] with questions.    If the diagnosis is CHF when the patient meets the CHF criteria for Cardiac Rehab with a new order we will gladly schedule them.

## 2023-09-08 NOTE — PLAN OF CARE
Goal Outcome Evaluation:  Plan of Care Reviewed With: patient, family  Progress: improving   Pt resting in bed, family at bedside. Pt has tolerated all interventions. Pt sent from Cath Lab for recovery. No complaints/concerns. No acute distress noted. Will continue to follow the plan of care.

## 2023-09-08 NOTE — Clinical Note
Hemostasis started on the right radial artery. Radial compression device applied to vessel. Hemostasis achieved successfully. Closure device additional comment: 12ml air applied to TR band.

## 2023-09-08 NOTE — INTERVAL H&P NOTE
H&P reviewed. The patient was examined and there are no changes to the H&P.      I have explained the risks associated with the procedure to the patient including but not limited to an allergic reaction to the contrast material or medications used during the procedure bleeding, infection, and bruising at the catheter insertion site blood clots, which may trigger heart attack, stroke,   damage to the artery where the catheter was inserted, or damage to the arteries as the catheter travels through your body, irregular heart rhythm arrhythmias, kidney damage caused by the contrast material.

## 2023-09-18 ENCOUNTER — TRANSCRIBE ORDERS (OUTPATIENT)
Dept: ADMINISTRATIVE | Facility: HOSPITAL | Age: 68
End: 2023-09-18
Payer: MEDICARE

## 2023-09-18 DIAGNOSIS — M79.89 LEG SWELLING: Primary | ICD-10-CM

## 2023-09-19 ENCOUNTER — OFFICE VISIT (OUTPATIENT)
Dept: CARDIOLOGY | Facility: CLINIC | Age: 68
End: 2023-09-19
Payer: MEDICARE

## 2023-09-19 VITALS
HEIGHT: 65 IN | BODY MASS INDEX: 30.82 KG/M2 | DIASTOLIC BLOOD PRESSURE: 56 MMHG | WEIGHT: 185 LBS | HEART RATE: 62 BPM | OXYGEN SATURATION: 97 % | SYSTOLIC BLOOD PRESSURE: 112 MMHG

## 2023-09-19 DIAGNOSIS — E11.9 TYPE 2 DIABETES MELLITUS WITHOUT COMPLICATION, WITHOUT LONG-TERM CURRENT USE OF INSULIN: ICD-10-CM

## 2023-09-19 DIAGNOSIS — Z01.810 PREOPERATIVE CARDIOVASCULAR EXAMINATION: ICD-10-CM

## 2023-09-19 DIAGNOSIS — I25.10 CORONARY ARTERY DISEASE INVOLVING NATIVE CORONARY ARTERY OF NATIVE HEART WITHOUT ANGINA PECTORIS: Primary | ICD-10-CM

## 2023-09-19 DIAGNOSIS — I10 HYPERTENSION, UNSPECIFIED TYPE: ICD-10-CM

## 2023-09-19 DIAGNOSIS — E78.5 HYPERLIPIDEMIA, UNSPECIFIED HYPERLIPIDEMIA TYPE: ICD-10-CM

## 2023-09-19 DIAGNOSIS — I44.7 LBBB (LEFT BUNDLE BRANCH BLOCK): Chronic | ICD-10-CM

## 2023-09-19 NOTE — PROGRESS NOTES
Subjective     Divine Stewart is a 67 y.o. female.   Chief Complaint   Patient presents with    Results     Post Heart cath follow up     History of Present Illness   Divine Stewart is a 67-year-old female who presents to clinic today for cardiology follow-up.  She is accompanied by her sister.    She has recently underwent a cardiac catheterization and is here today to discuss the results.  She reports procedure went well and denies any problems or complaints.  She denies chest pain, dyspnea, or palpitations.    She is planning to have a hemorrhoid procedure and bowel prolapse procedure in [place].  She is unsure of the name of her surgeon.  She is needing cardiac clearance.    CAD, will continue on aspirin statin and beta-blocker.  She is asymptomatic, will continue closely monitor.    Hyperlipidemia on Crestor, LDL goal is less than 70.  Recent cholesterol panel was noted with an LDL calculated at 50.    Patient Active Problem List   Diagnosis    Pancytopenia    LBBB (left bundle branch block)    Abnormal nuclear stress test    Multiple risk factors for coronary artery disease    CAD (coronary artery disease)    Type 2 diabetes mellitus without complication, without long-term current use of insulin    Hypertension    Hyperlipidemia    Preoperative cardiovascular examination     Past Medical History:   Diagnosis Date    Arthritis     Coronary artery disease     Depression     Diabetes mellitus     Hyperlipidemia     Hypertension     Incontinence     B&B    Infectious viral hepatitis     Kidney stone     LBBB (left bundle branch block) 08/23/2023     Past Surgical History:   Procedure Laterality Date    APPENDECTOMY      BREAST BIOPSY Right     benign    CARDIAC CATHETERIZATION      CARDIAC CATHETERIZATION N/A 9/8/2023    Procedure: Left Heart Cath;  Surgeon: Satish Segal MD;  Location:  [facility];  Service: Cardiology;  Laterality: N/A;    CHOLECYSTECTOMY      EYE SURGERY       HEMORRHOIDECTOMY      SPINE SURGERY      lumbar    TEMPOROMANDIBULAR JOINT SURGERY      TUBAL ABDOMINAL LIGATION       Family History   Problem Relation Age of Onset    Diabetes Mother     Heart disease Father     No Known Problems Maternal Grandmother     No Known Problems Maternal Grandfather     No Known Problems Paternal Grandmother     No Known Problems Paternal Grandfather     Breast cancer Neg Hx      Social History     Tobacco Use    Smoking status: Former     Packs/day: 1.00     Years: 25.00     Pack years: 25.00     Types: Cigarettes     Quit date: 2000     Years since quittin.7     Passive exposure: Past   Vaping Use    Vaping Use: Never used   Substance Use Topics    Alcohol use: No    Drug use: No         The following portions of the patient's history were reviewed and updated as appropriate: allergies, current medications, past family history, past medical history, past social history, past surgical history and problem list.    Allergies   Allergen Reactions    Bee Venom Hives         Current Outpatient Medications:     aspirin 81 MG EC tablet, Take 1 tablet by mouth Daily., Disp: , Rfl:     Insulin Lispro, 1 Unit Dial, (HUMALOG) 100 UNIT/ML solution pen-injector, Inject  under the skin into the appropriate area as directed 3 (Three) Times a Day., Disp: , Rfl:     isosorbide mononitrate (IMDUR) 30 MG 24 hr tablet, Take 1 tablet by mouth Daily., Disp: , Rfl:     Lantus SoloStar 100 UNIT/ML injection pen, Inject 30 Units under the skin into the appropriate area as directed Daily., Disp: , Rfl:     losartan (COZAAR) 25 MG tablet, Take 1 tablet by mouth Daily. for blood pressure, Disp: , Rfl:     metFORMIN (GLUCOPHAGE) 1000 MG tablet, Take 1 tablet by mouth 2 (Two) Times a Day With Meals., Disp: , Rfl:     metoprolol succinate XL (TOPROL-XL) 25 MG 24 hr tablet, Take 1 tablet by mouth Daily., Disp: , Rfl:     oxyCODONE-acetaminophen (PERCOCET) 7.5-325 MG per tablet, Take 1 tablet by mouth Every  "6 (Six) Hours As Needed., Disp: , Rfl:     pantoprazole (PROTONIX) 40 MG EC tablet, Take 1 tablet by mouth Daily., Disp: , Rfl:     pramipexole (MIRAPEX) 0.25 MG tablet, Take 1 tablet by mouth Every Night., Disp: , Rfl:     pregabalin (LYRICA) 150 MG capsule, Take 1 capsule by mouth Every Night., Disp: , Rfl:     rosuvastatin (CRESTOR) 20 MG tablet, Take 1 tablet by mouth Every Night., Disp: , Rfl:     venlafaxine (EFFEXOR) 75 MG tablet, Take 1 tablet by mouth 2 (Two) Times a Day., Disp: , Rfl:     Review of Systems   Constitutional:  Negative for activity change, appetite change, chills, diaphoresis, fatigue and fever.   HENT:  Negative for congestion, drooling, ear discharge, ear pain, mouth sores, nosebleeds, postnasal drip, rhinorrhea, sinus pressure, sneezing and sore throat.    Eyes:  Negative for pain, discharge and visual disturbance.   Respiratory:  Negative for cough, chest tightness, shortness of breath and wheezing.    Cardiovascular:  Negative for chest pain, palpitations and leg swelling.   Gastrointestinal:  Negative for abdominal pain, constipation, diarrhea, nausea and vomiting.   Endocrine: Negative for cold intolerance, heat intolerance, polydipsia, polyphagia and polyuria.   Musculoskeletal:  Negative for arthralgias, myalgias and neck pain.   Skin:  Negative for rash and wound.   Neurological:  Negative for dizziness, syncope, speech difficulty, weakness, light-headedness and headaches.   Hematological:  Negative for adenopathy. Does not bruise/bleed easily.   Psychiatric/Behavioral:  Negative for confusion, dysphoric mood and sleep disturbance. The patient is not nervous/anxious.    All other systems reviewed and are negative.    /56 (BP Location: Left arm, Patient Position: Sitting, Cuff Size: Adult)   Pulse 62   Ht 163.8 cm (64.5\")   Wt 83.9 kg (185 lb)   SpO2 97%   BMI 31.26 kg/m²     Objective   Allergies   Allergen Reactions    Bee Venom Hives       Physical Exam  Vitals " reviewed.   Constitutional:       Appearance: Normal appearance. She is well-developed and overweight.   HENT:      Head: Normocephalic.   Eyes:      Conjunctiva/sclera: Conjunctivae normal.   Neck:      Thyroid: No thyromegaly.      Vascular: No carotid bruit or JVD.   Cardiovascular:      Rate and Rhythm: Normal rate and regular rhythm.   Pulmonary:      Effort: Pulmonary effort is normal.      Breath sounds: Normal breath sounds.   Musculoskeletal:      Cervical back: Neck supple.      Right lower leg: No edema.      Left lower leg: No edema.   Skin:     General: Skin is warm and dry.   Neurological:      Mental Status: She is alert and oriented to person, place, and time.   Psychiatric:         Attention and Perception: Attention normal.         Mood and Affect: Mood normal.         Speech: Speech normal.         Behavior: Behavior normal. Behavior is cooperative.         Cognition and Memory: Cognition normal.     LABS  WBC   Date Value Ref Range Status   09/08/2023 5.70 3.40 - 10.80 10*3/mm3 Final     RBC   Date Value Ref Range Status   09/08/2023 3.89 3.77 - 5.28 10*6/mm3 Final     Hemoglobin   Date Value Ref Range Status   09/08/2023 10.1 (L) 12.0 - 15.9 g/dL Final     Hematocrit   Date Value Ref Range Status   09/08/2023 33.1 (L) 34.0 - 46.6 % Final     MCV   Date Value Ref Range Status   09/08/2023 85.1 79.0 - 97.0 fL Final     MCH   Date Value Ref Range Status   09/08/2023 26.0 (L) 26.6 - 33.0 pg Final     MCHC   Date Value Ref Range Status   09/08/2023 30.5 (L) 31.5 - 35.7 g/dL Final     RDW   Date Value Ref Range Status   09/08/2023 14.4 12.3 - 15.4 % Final     RDW-SD   Date Value Ref Range Status   09/08/2023 44.7 37.0 - 54.0 fl Final     MPV   Date Value Ref Range Status   09/08/2023 9.6 6.0 - 12.0 fL Final     Platelets   Date Value Ref Range Status   09/08/2023 170 140 - 450 10*3/mm3 Final     Neutrophil %   Date Value Ref Range Status   09/08/2023 50.3 42.7 - 76.0 % Final     Lymphocyte %   Date  Value Ref Range Status   09/08/2023 38.9 19.6 - 45.3 % Final     Monocyte %   Date Value Ref Range Status   09/08/2023 5.3 5.0 - 12.0 % Final     Eosinophil %   Date Value Ref Range Status   09/08/2023 4.2 0.3 - 6.2 % Final     Basophil %   Date Value Ref Range Status   09/08/2023 1.1 0.0 - 1.5 % Final     Immature Grans %   Date Value Ref Range Status   09/08/2023 0.2 0.0 - 0.5 % Final     Neutrophils, Absolute   Date Value Ref Range Status   09/08/2023 2.87 1.70 - 7.00 10*3/mm3 Final     Lymphocytes, Absolute   Date Value Ref Range Status   09/08/2023 2.22 0.70 - 3.10 10*3/mm3 Final     Monocytes, Absolute   Date Value Ref Range Status   09/08/2023 0.30 0.10 - 0.90 10*3/mm3 Final     Eosinophils, Absolute   Date Value Ref Range Status   09/08/2023 0.24 0.00 - 0.40 10*3/mm3 Final     Basophils, Absolute   Date Value Ref Range Status   09/08/2023 0.06 0.00 - 0.20 10*3/mm3 Final     Immature Grans, Absolute   Date Value Ref Range Status   09/08/2023 0.01 0.00 - 0.05 10*3/mm3 Final     nRBC   Date Value Ref Range Status   09/08/2023 0.0 0.0 - 0.2 /100 WBC Final     IMAGING   X-ray chest PA and lateral    Result Date: 8/3/2023  No acute cardiopulmonary process. Images reviewed, interpreted, and dictated by Dr. CHEY Delgado. Transcribed by UCHE Jacobsen (R).      Cardiac Cath 9/8/2023  Coronary anatomy findings:     LM: Is a large calibre vessel , normal take off from left cusp, divides into LAD and Lcx.   Large LM with no significant stenosis     LAD: Mid diffuse disease, mid LAD 40-50% , remaining distal LAD has no stenosis     LCX: Large calibre  DOMINANT vessel, there are 3 OM branches all had mild diffuse disease , OM 4 artery had no stenosis         RCA:Small non dominant artery, no stenosis        LVEDP: 14 mmHg  No gradient across the aortic valve on pull back.               EBL: Less than 10cc        Final Impression:  Mild to moderate non obstructive CAD              Recommendations:  Reviewed  stress MPI images, apical defect appeats worse in rest compared to stress likely secondary to attenuation artifact           Assessment & Plan   Diagnoses and all orders for this visit:    1. Coronary artery disease involving native coronary artery of native heart without angina pectoris (Primary)  Discussed and reviewed recent cardiac cath  Continue aspirin, statin and beta-blocker    2. Preoperative cardiovascular examination  Surgical clearance, she will contact us with the name of the surgeon    3. Hypertension, unspecified type  Controlled, continue current therapy    4. Hyperlipidemia, unspecified hyperlipidemia type  Continue on statin, heart healthy diet LDL goal less than 70    5. LBBB (left bundle branch block)  Chronic    6. Type 2 diabetes mellitus without complication, without long-term current use of insulin  Encourage in tight glycemic control for overall health benefit    Lifestyle modifications including heart healthy diet, regular exercise, maintenance of desirable body weight and avoidance of tobacco products.    Review of medical records     Follow-up in 6 months, sooner if needed

## 2023-09-19 NOTE — LETTER
September 19, 2023     Debbie Calvo MD  [address]  [facility] [address]  [place] [address]    Patient: Divine Stewart   YOB: 1955   Date of Visit: 9/19/2023       Dear Debbie Calvo MD    Divine Stewart was in my office today. Below is a copy of my note.    If you have questions, please do not hesitate to call me. I look forward to following Divine along with you.         Sincerely,        KELLY Ko        CC: No Recipients    Subjective    Divine Stewart is a 67 y.o. female.   Chief Complaint   Patient presents with   • Results     Post Heart cath follow up     History of Present Illness   Divine Stewart is a 67-year-old female who presents to clinic today for cardiology follow-up.  She is accompanied by her sister.    She has recently underwent a cardiac catheterization and is here today to discuss the results.  She reports procedure went well and denies any problems or complaints.  She denies chest pain, dyspnea, or palpitations.    She is planning to have a hemorrhoid procedure and bowel prolapse procedure in [place].  She is unsure of the name of her surgeon.  She is needing cardiac clearance.    CAD, will continue on aspirin statin and beta-blocker.  She is asymptomatic, will continue closely monitor.    Hyperlipidemia on Crestor, LDL goal is less than 70.  Recent cholesterol panel was noted with an LDL calculated at 50.    Patient Active Problem List   Diagnosis   • Pancytopenia   • LBBB (left bundle branch block)   • Abnormal nuclear stress test   • Multiple risk factors for coronary artery disease   • CAD (coronary artery disease)   • Type 2 diabetes mellitus without complication, without long-term current use of insulin   • Hypertension   • Hyperlipidemia   • Preoperative cardiovascular examination     Past Medical History:   Diagnosis Date   • Arthritis    • Coronary artery disease    • Depression    • Diabetes mellitus    • Hyperlipidemia    • Hypertension     • Incontinence     B&B   • Infectious viral hepatitis    • Kidney stone    • LBBB (left bundle branch block) 2023     Past Surgical History:   Procedure Laterality Date   • APPENDECTOMY     • BREAST BIOPSY Right     benign   • CARDIAC CATHETERIZATION     • CARDIAC CATHETERIZATION N/A 2023    Procedure: Left Heart Cath;  Surgeon: Satish Segal MD;  Location:  [facility];  Service: Cardiology;  Laterality: N/A;   • CHOLECYSTECTOMY     • EYE SURGERY     • HEMORRHOIDECTOMY     • SPINE SURGERY      lumbar   • TEMPOROMANDIBULAR JOINT SURGERY     • TUBAL ABDOMINAL LIGATION       Family History   Problem Relation Age of Onset   • Diabetes Mother    • Heart disease Father    • No Known Problems Maternal Grandmother    • No Known Problems Maternal Grandfather    • No Known Problems Paternal Grandmother    • No Known Problems Paternal Grandfather    • Breast cancer Neg Hx      Social History     Tobacco Use   • Smoking status: Former     Packs/day: 1.00     Years: 25.00     Pack years: 25.00     Types: Cigarettes     Quit date: 2000     Years since quittin.7     Passive exposure: Past   Vaping Use   • Vaping Use: Never used   Substance Use Topics   • Alcohol use: No   • Drug use: No         The following portions of the patient's history were reviewed and updated as appropriate: allergies, current medications, past family history, past medical history, past social history, past surgical history and problem list.    Allergies   Allergen Reactions   • Bee Venom Hives         Current Outpatient Medications:   •  aspirin 81 MG EC tablet, Take 1 tablet by mouth Daily., Disp: , Rfl:   •  Insulin Lispro, 1 Unit Dial, (HUMALOG) 100 UNIT/ML solution pen-injector, Inject  under the skin into the appropriate area as directed 3 (Three) Times a Day., Disp: , Rfl:   •  isosorbide mononitrate (IMDUR) 30 MG 24 hr tablet, Take 1 tablet by mouth Daily., Disp: , Rfl:   •  Lantus SoloStar 100  UNIT/ML injection pen, Inject 30 Units under the skin into the appropriate area as directed Daily., Disp: , Rfl:   •  losartan (COZAAR) 25 MG tablet, Take 1 tablet by mouth Daily. for blood pressure, Disp: , Rfl:   •  metFORMIN (GLUCOPHAGE) 1000 MG tablet, Take 1 tablet by mouth 2 (Two) Times a Day With Meals., Disp: , Rfl:   •  metoprolol succinate XL (TOPROL-XL) 25 MG 24 hr tablet, Take 1 tablet by mouth Daily., Disp: , Rfl:   •  oxyCODONE-acetaminophen (PERCOCET) 7.5-325 MG per tablet, Take 1 tablet by mouth Every 6 (Six) Hours As Needed., Disp: , Rfl:   •  pantoprazole (PROTONIX) 40 MG EC tablet, Take 1 tablet by mouth Daily., Disp: , Rfl:   •  pramipexole (MIRAPEX) 0.25 MG tablet, Take 1 tablet by mouth Every Night., Disp: , Rfl:   •  pregabalin (LYRICA) 150 MG capsule, Take 1 capsule by mouth Every Night., Disp: , Rfl:   •  rosuvastatin (CRESTOR) 20 MG tablet, Take 1 tablet by mouth Every Night., Disp: , Rfl:   •  venlafaxine (EFFEXOR) 75 MG tablet, Take 1 tablet by mouth 2 (Two) Times a Day., Disp: , Rfl:     Review of Systems   Constitutional:  Negative for activity change, appetite change, chills, diaphoresis, fatigue and fever.   HENT:  Negative for congestion, drooling, ear discharge, ear pain, mouth sores, nosebleeds, postnasal drip, rhinorrhea, sinus pressure, sneezing and sore throat.    Eyes:  Negative for pain, discharge and visual disturbance.   Respiratory:  Negative for cough, chest tightness, shortness of breath and wheezing.    Cardiovascular:  Negative for chest pain, palpitations and leg swelling.   Gastrointestinal:  Negative for abdominal pain, constipation, diarrhea, nausea and vomiting.   Endocrine: Negative for cold intolerance, heat intolerance, polydipsia, polyphagia and polyuria.   Musculoskeletal:  Negative for arthralgias, myalgias and neck pain.   Skin:  Negative for rash and wound.   Neurological:  Negative for dizziness, syncope, speech difficulty, weakness, light-headedness and  "headaches.   Hematological:  Negative for adenopathy. Does not bruise/bleed easily.   Psychiatric/Behavioral:  Negative for confusion, dysphoric mood and sleep disturbance. The patient is not nervous/anxious.    All other systems reviewed and are negative.    /56 (BP Location: Left arm, Patient Position: Sitting, Cuff Size: Adult)   Pulse 62   Ht 163.8 cm (64.5\")   Wt 83.9 kg (185 lb)   SpO2 97%   BMI 31.26 kg/m²     Objective  Allergies   Allergen Reactions   • Bee Venom Hives       Physical Exam  Vitals reviewed.   Constitutional:       Appearance: Normal appearance. She is well-developed and overweight.   HENT:      Head: Normocephalic.   Eyes:      Conjunctiva/sclera: Conjunctivae normal.   Neck:      Thyroid: No thyromegaly.      Vascular: No carotid bruit or JVD.   Cardiovascular:      Rate and Rhythm: Normal rate and regular rhythm.   Pulmonary:      Effort: Pulmonary effort is normal.      Breath sounds: Normal breath sounds.   Musculoskeletal:      Cervical back: Neck supple.      Right lower leg: No edema.      Left lower leg: No edema.   Skin:     General: Skin is warm and dry.   Neurological:      Mental Status: She is alert and oriented to person, place, and time.   Psychiatric:         Attention and Perception: Attention normal.         Mood and Affect: Mood normal.         Speech: Speech normal.         Behavior: Behavior normal. Behavior is cooperative.         Cognition and Memory: Cognition normal.     LABS  WBC   Date Value Ref Range Status   09/08/2023 5.70 3.40 - 10.80 10*3/mm3 Final     RBC   Date Value Ref Range Status   09/08/2023 3.89 3.77 - 5.28 10*6/mm3 Final     Hemoglobin   Date Value Ref Range Status   09/08/2023 10.1 (L) 12.0 - 15.9 g/dL Final     Hematocrit   Date Value Ref Range Status   09/08/2023 33.1 (L) 34.0 - 46.6 % Final     MCV   Date Value Ref Range Status   09/08/2023 85.1 79.0 - 97.0 fL Final     MCH   Date Value Ref Range Status   09/08/2023 26.0 (L) 26.6 - 33.0 " pg Final     MCHC   Date Value Ref Range Status   09/08/2023 30.5 (L) 31.5 - 35.7 g/dL Final     RDW   Date Value Ref Range Status   09/08/2023 14.4 12.3 - 15.4 % Final     RDW-SD   Date Value Ref Range Status   09/08/2023 44.7 37.0 - 54.0 fl Final     MPV   Date Value Ref Range Status   09/08/2023 9.6 6.0 - 12.0 fL Final     Platelets   Date Value Ref Range Status   09/08/2023 170 140 - 450 10*3/mm3 Final     Neutrophil %   Date Value Ref Range Status   09/08/2023 50.3 42.7 - 76.0 % Final     Lymphocyte %   Date Value Ref Range Status   09/08/2023 38.9 19.6 - 45.3 % Final     Monocyte %   Date Value Ref Range Status   09/08/2023 5.3 5.0 - 12.0 % Final     Eosinophil %   Date Value Ref Range Status   09/08/2023 4.2 0.3 - 6.2 % Final     Basophil %   Date Value Ref Range Status   09/08/2023 1.1 0.0 - 1.5 % Final     Immature Grans %   Date Value Ref Range Status   09/08/2023 0.2 0.0 - 0.5 % Final     Neutrophils, Absolute   Date Value Ref Range Status   09/08/2023 2.87 1.70 - 7.00 10*3/mm3 Final     Lymphocytes, Absolute   Date Value Ref Range Status   09/08/2023 2.22 0.70 - 3.10 10*3/mm3 Final     Monocytes, Absolute   Date Value Ref Range Status   09/08/2023 0.30 0.10 - 0.90 10*3/mm3 Final     Eosinophils, Absolute   Date Value Ref Range Status   09/08/2023 0.24 0.00 - 0.40 10*3/mm3 Final     Basophils, Absolute   Date Value Ref Range Status   09/08/2023 0.06 0.00 - 0.20 10*3/mm3 Final     Immature Grans, Absolute   Date Value Ref Range Status   09/08/2023 0.01 0.00 - 0.05 10*3/mm3 Final     nRBC   Date Value Ref Range Status   09/08/2023 0.0 0.0 - 0.2 /100 WBC Final     IMAGING   X-ray chest PA and lateral    Result Date: 8/3/2023  No acute cardiopulmonary process. Images reviewed, interpreted, and dictated by Dr. CHEY Delgado. Transcribed by UCHE Jacobsen (R).      Cardiac Cath 9/8/2023  Coronary anatomy findings:     LM: Is a large calibre vessel , normal take off from left cusp, divides into LAD and  Lcx.   Large LM with no significant stenosis     LAD: Mid diffuse disease, mid LAD 40-50% , remaining distal LAD has no stenosis     LCX: Large calibre  DOMINANT vessel, there are 3 OM branches all had mild diffuse disease , OM 4 artery had no stenosis         RCA:Small non dominant artery, no stenosis        LVEDP: 14 mmHg  No gradient across the aortic valve on pull back.               EBL: Less than 10cc        Final Impression:  Mild to moderate non obstructive CAD              Recommendations:  Reviewed stress MPI images, apical defect appeats worse in rest compared to stress likely secondary to attenuation artifact           Assessment & Plan  Diagnoses and all orders for this visit:    1. Coronary artery disease involving native coronary artery of native heart without angina pectoris (Primary)  Discussed and reviewed recent cardiac cath  Continue aspirin, statin and beta-blocker    2. Preoperative cardiovascular examination  Surgical clearance, she will contact us with the name of the surgeon    3. Hypertension, unspecified type  Controlled, continue current therapy    4. Hyperlipidemia, unspecified hyperlipidemia type  Continue on statin, heart healthy diet LDL goal less than 70    5. LBBB (left bundle branch block)  Chronic    6. Type 2 diabetes mellitus without complication, without long-term current use of insulin  Encourage in tight glycemic control for overall health benefit    Lifestyle modifications including heart healthy diet, regular exercise, maintenance of desirable body weight and avoidance of tobacco products.    Review of medical records     Follow-up in 6 months, sooner if needed

## 2023-10-06 ENCOUNTER — TRANSCRIBE ORDERS (OUTPATIENT)
Dept: ADMINISTRATIVE | Facility: HOSPITAL | Age: 68
End: 2023-10-06
Payer: MEDICARE

## 2023-10-06 ENCOUNTER — LAB (OUTPATIENT)
Dept: LAB | Facility: HOSPITAL | Age: 68
End: 2023-10-06
Payer: MEDICARE

## 2023-10-06 DIAGNOSIS — K64.2 THIRD DEGREE HEMORRHOIDS: ICD-10-CM

## 2023-10-06 DIAGNOSIS — K64.2 THIRD DEGREE HEMORRHOIDS: Primary | ICD-10-CM

## 2023-10-06 PROCEDURE — 85025 COMPLETE CBC W/AUTO DIFF WBC: CPT

## 2023-10-06 PROCEDURE — 36415 COLL VENOUS BLD VENIPUNCTURE: CPT

## 2023-10-06 PROCEDURE — 80048 BASIC METABOLIC PNL TOTAL CA: CPT

## 2023-10-07 LAB
ANION GAP SERPL CALCULATED.3IONS-SCNC: 12 MMOL/L (ref 5–15)
BASOPHILS # BLD AUTO: 0.05 10*3/MM3 (ref 0–0.2)
BASOPHILS NFR BLD AUTO: 0.9 % (ref 0–1.5)
BUN SERPL-MCNC: 18 MG/DL (ref 8–23)
BUN/CREAT SERPL: 15.3 (ref 7–25)
CALCIUM SPEC-SCNC: 9.1 MG/DL (ref 8.6–10.5)
CHLORIDE SERPL-SCNC: 103 MMOL/L (ref 98–107)
CO2 SERPL-SCNC: 22 MMOL/L (ref 22–29)
CREAT SERPL-MCNC: 1.18 MG/DL (ref 0.57–1)
DEPRECATED RDW RBC AUTO: 41.4 FL (ref 37–54)
EGFRCR SERPLBLD CKD-EPI 2021: 50.7 ML/MIN/1.73
EOSINOPHIL # BLD AUTO: 0.13 10*3/MM3 (ref 0–0.4)
EOSINOPHIL NFR BLD AUTO: 2.2 % (ref 0.3–6.2)
ERYTHROCYTE [DISTWIDTH] IN BLOOD BY AUTOMATED COUNT: 14.2 % (ref 12.3–15.4)
GLUCOSE SERPL-MCNC: 234 MG/DL (ref 65–99)
HCT VFR BLD AUTO: 30.8 % (ref 34–46.6)
HGB BLD-MCNC: 9.5 G/DL (ref 12–15.9)
IMM GRANULOCYTES # BLD AUTO: 0.02 10*3/MM3 (ref 0–0.05)
IMM GRANULOCYTES NFR BLD AUTO: 0.3 % (ref 0–0.5)
LYMPHOCYTES # BLD AUTO: 1.71 10*3/MM3 (ref 0.7–3.1)
LYMPHOCYTES NFR BLD AUTO: 29.5 % (ref 19.6–45.3)
MCH RBC QN AUTO: 25.3 PG (ref 26.6–33)
MCHC RBC AUTO-ENTMCNC: 30.8 G/DL (ref 31.5–35.7)
MCV RBC AUTO: 81.9 FL (ref 79–97)
MONOCYTES # BLD AUTO: 0.32 10*3/MM3 (ref 0.1–0.9)
MONOCYTES NFR BLD AUTO: 5.5 % (ref 5–12)
NEUTROPHILS NFR BLD AUTO: 3.56 10*3/MM3 (ref 1.7–7)
NEUTROPHILS NFR BLD AUTO: 61.6 % (ref 42.7–76)
NRBC BLD AUTO-RTO: 0 /100 WBC (ref 0–0.2)
PLATELET # BLD AUTO: 169 10*3/MM3 (ref 140–450)
PMV BLD AUTO: 10.9 FL (ref 6–12)
POTASSIUM SERPL-SCNC: 5.4 MMOL/L (ref 3.5–5.2)
RBC # BLD AUTO: 3.76 10*6/MM3 (ref 3.77–5.28)
SODIUM SERPL-SCNC: 137 MMOL/L (ref 136–145)
WBC NRBC COR # BLD: 5.79 10*3/MM3 (ref 3.4–10.8)

## 2024-05-28 ENCOUNTER — HOSPITAL ENCOUNTER (OUTPATIENT)
Dept: GENERAL RADIOLOGY | Facility: HOSPITAL | Age: 69
Discharge: HOME | End: 2024-05-28
Payer: MEDICARE

## 2024-05-28 ENCOUNTER — TRANSCRIBE ORDERS (OUTPATIENT)
Dept: LAB | Facility: HOSPITAL | Age: 69
End: 2024-05-28
Payer: MEDICARE

## 2024-05-28 DIAGNOSIS — M25.511 RIGHT SHOULDER PAIN, UNSPECIFIED CHRONICITY: Primary | ICD-10-CM

## 2024-05-28 DIAGNOSIS — M79.671 PAIN IN RIGHT FOOT: ICD-10-CM

## 2024-05-28 DIAGNOSIS — M25.511 RIGHT SHOULDER PAIN, UNSPECIFIED CHRONICITY: ICD-10-CM

## 2024-05-28 PROCEDURE — 73630 X-RAY EXAM OF FOOT: CPT | Performed by: RADIOLOGY

## 2024-05-28 PROCEDURE — 73030 X-RAY EXAM OF SHOULDER: CPT | Performed by: RADIOLOGY

## 2024-05-28 PROCEDURE — 73630 X-RAY EXAM OF FOOT: CPT

## 2024-05-28 PROCEDURE — 73030 X-RAY EXAM OF SHOULDER: CPT

## 2025-08-07 ENCOUNTER — HOSPITAL ENCOUNTER (OUTPATIENT)
Facility: HOSPITAL | Age: 70
Discharge: HOME | End: 2025-08-07
Payer: MEDICARE

## 2025-08-07 ENCOUNTER — TRANSCRIBE ORDERS (OUTPATIENT)
Dept: LAB | Facility: HOSPITAL | Age: 70
End: 2025-08-07
Payer: MEDICARE

## 2025-08-07 DIAGNOSIS — M25.571 PAIN, JOINT, FOOT, RIGHT: ICD-10-CM

## 2025-08-07 DIAGNOSIS — T14.8XXA FRACTURE IN ACCIDENTAL FALL: ICD-10-CM

## 2025-08-07 DIAGNOSIS — M79.671 PAIN IN RIGHT FOOT: ICD-10-CM

## 2025-08-07 DIAGNOSIS — M79.604 RIGHT LEG PAIN: ICD-10-CM

## 2025-08-07 DIAGNOSIS — T14.8XXA FRACTURE IN ACCIDENTAL FALL: Primary | ICD-10-CM
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
## 2025-08-07 PROCEDURE — 73630 X-RAY EXAM OF FOOT: CPT

## 2025-08-07 PROCEDURE — 73564 X-RAY EXAM KNEE 4 OR MORE: CPT

## 2025-08-07 PROCEDURE — 73590 X-RAY EXAM OF LOWER LEG: CPT

## 2025-08-07 PROCEDURE — 73610 X-RAY EXAM OF ANKLE: CPT

## 2025-08-22 ENCOUNTER — OFFICE VISIT (OUTPATIENT)
Dept: ENDOCRINOLOGY | Facility: CLINIC | Age: 70
End: 2025-08-22
Payer: MEDICARE

## 2025-08-22 VITALS
HEART RATE: 70 BPM | DIASTOLIC BLOOD PRESSURE: 65 MMHG | SYSTOLIC BLOOD PRESSURE: 118 MMHG | WEIGHT: 156 LBS | BODY MASS INDEX: 26.36 KG/M2 | OXYGEN SATURATION: 96 %

## 2025-08-22 DIAGNOSIS — E11.9 TYPE 2 DIABETES MELLITUS WITHOUT COMPLICATION, WITHOUT LONG-TERM CURRENT USE OF INSULIN: Primary | ICD-10-CM

## 2025-08-22 LAB
ALBUMIN SERPL-MCNC: 4.3 G/DL (ref 3.5–5.2)
ALBUMIN UR-MCNC: 3.8 MG/DL
ALBUMIN/GLOB SERPL: 1.5 G/DL
ALP SERPL-CCNC: 92 U/L (ref 39–117)
ALT SERPL W P-5'-P-CCNC: 9 U/L (ref 1–33)
ANION GAP SERPL CALCULATED.3IONS-SCNC: 11 MMOL/L (ref 5–15)
AST SERPL-CCNC: 17 U/L (ref 1–32)
BILIRUB SERPL-MCNC: 0.3 MG/DL (ref 0–1.2)
BUN SERPL-MCNC: 14.2 MG/DL (ref 8–23)
BUN/CREAT SERPL: 13.4 (ref 7–25)
CALCIUM SPEC-SCNC: 9.6 MG/DL (ref 8.6–10.5)
CHLORIDE SERPL-SCNC: 104 MMOL/L (ref 98–107)
CHOLEST SERPL-MCNC: 127 MG/DL (ref 0–200)
CO2 SERPL-SCNC: 28 MMOL/L (ref 22–29)
CREAT SERPL-MCNC: 1.06 MG/DL (ref 0.57–1)
CREAT UR-MCNC: 67.7 MG/DL
EGFRCR SERPLBLD CKD-EPI 2021: 57 ML/MIN/1.73
EXPIRATION DATE: ABNORMAL
GLOBULIN UR ELPH-MCNC: 2.9 GM/DL
GLUCOSE BLDC GLUCOMTR-MCNC: 132 MG/DL (ref 70–130)
GLUCOSE SERPL-MCNC: 66 MG/DL (ref 65–99)
HBA1C MFR BLD: 6.4 % (ref 4.5–5.7)
HDLC SERPL-MCNC: 54 MG/DL (ref 40–60)
LDLC SERPL CALC-MCNC: 55 MG/DL (ref 0–100)
LDLC/HDLC SERPL: 0.99 {RATIO}
Lab: ABNORMAL
MICROALBUMIN/CREAT UR: 56.1 MG/G (ref 0–29)
POTASSIUM SERPL-SCNC: 4.7 MMOL/L (ref 3.5–5.2)
PROT SERPL-MCNC: 7.2 G/DL (ref 6–8.5)
SODIUM SERPL-SCNC: 143 MMOL/L (ref 136–145)
TRIGL SERPL-MCNC: 99 MG/DL (ref 0–150)
TSH SERPL DL<=0.05 MIU/L-ACNC: 0.83 UIU/ML (ref 0.27–4.2)
VLDLC SERPL-MCNC: 18 MG/DL (ref 5–40)

## 2025-08-22 RX ORDER — EMPAGLIFLOZIN 25 MG/1
25 TABLET, FILM COATED ORAL DAILY
Qty: 90 TABLET | Refills: 0 | Status: SHIPPED | OUTPATIENT
Start: 2025-08-22

## 2025-08-22 RX ORDER — ACYCLOVIR 400 MG/1
1 TABLET ORAL TAKE AS DIRECTED
Qty: 1 EACH | Refills: 0 | Status: SHIPPED | OUTPATIENT
Start: 2025-08-22

## 2025-08-22 RX ORDER — TRAMADOL HYDROCHLORIDE 50 MG/1
50 TABLET ORAL
COMMUNITY

## 2025-08-22 RX ORDER — GLIPIZIDE 10 MG/1
10 TABLET, FILM COATED, EXTENDED RELEASE ORAL DAILY
Qty: 90 TABLET | Refills: 0 | Status: SHIPPED | OUTPATIENT
Start: 2025-08-22

## 2025-08-22 RX ORDER — ACYCLOVIR 400 MG/1
1 TABLET ORAL
Qty: 3 EACH | Refills: 3 | Status: SHIPPED | OUTPATIENT
Start: 2025-08-22

## 2025-08-22 RX ORDER — GLIPIZIDE 5 MG/1
5 TABLET, FILM COATED, EXTENDED RELEASE ORAL DAILY
COMMUNITY
End: 2025-08-22

## 2025-08-22 RX ORDER — GLUCAGON INJECTION, SOLUTION 1 MG/.2ML
1 INJECTION, SOLUTION SUBCUTANEOUS AS NEEDED
Qty: 0.2 ML | Refills: 5 | Status: SHIPPED | OUTPATIENT
Start: 2025-08-22

## (undated) DEVICE — PK CATH CARD 70

## (undated) DEVICE — GLIDESHEATH SLENDER STAINLESS STEEL KIT: Brand: GLIDESHEATH SLENDER

## (undated) DEVICE — PAD, DEFIB, ADULT, RADIOTRANS, ZOLL: Brand: MEDLINE

## (undated) DEVICE — ASMBL SPK CONTRST CONTRL

## (undated) DEVICE — LN FLTR ORL/NASL MICROSTREAM NONINTUB A/LNG

## (undated) DEVICE — ST INF PRI SMRTSTE 20DRP 2VLV 24ML 117

## (undated) DEVICE — A2000 MULTI-USE SYRINGE KIT, P/N 701277-003KIT CONTENTS: 100ML CONTRAST RESERVOIR AND TUBING WITH CONTRAST SPIKE AND CLAMP: Brand: A2000 MULTI-USE SYRINGE KIT

## (undated) DEVICE — DRSNG SURESITE WNDW 4X4.5

## (undated) DEVICE — ST EXT IV SMRTSTE 2VLV FIX M LL 6ML 41

## (undated) DEVICE — TR BAND RADIAL ARTERY COMPRESSION DEVICE: Brand: TR BAND

## (undated) DEVICE — RUNWAY RADL W/TOP PAD

## (undated) DEVICE — DRAPE, RADIAL, STERILE: Brand: MEDLINE

## (undated) DEVICE — ADULT DISPOSABLE SINGLE-PATIENT USE PULSE OXIMETER SENSOR: Brand: NONIN

## (undated) DEVICE — GW INQW FIX/CORE PTFE J/3MM .035 260CM

## (undated) DEVICE — RADIFOCUS OPTITORQUE ANGIOGRAPHIC CATHETER: Brand: OPTITORQUE

## (undated) DEVICE — MODEL AT P65, P/N 701554-001KIT CONTENTS: HAND CONTROLLER, 3-WAY HIGH-PRESSURE STOPCOCK WITH ROTATING END AND PREMIUM HIGH-PRESSURE TUBING: Brand: ANGIOTOUCH® KIT